# Patient Record
Sex: FEMALE | Race: WHITE | NOT HISPANIC OR LATINO | Employment: FULL TIME | URBAN - METROPOLITAN AREA
[De-identification: names, ages, dates, MRNs, and addresses within clinical notes are randomized per-mention and may not be internally consistent; named-entity substitution may affect disease eponyms.]

---

## 2017-03-29 ENCOUNTER — OFFICE VISIT (OUTPATIENT)
Dept: OBSTETRICS AND GYNECOLOGY | Facility: CLINIC | Age: 37
End: 2017-03-29
Payer: COMMERCIAL

## 2017-03-29 VITALS
WEIGHT: 205.69 LBS | HEIGHT: 65 IN | DIASTOLIC BLOOD PRESSURE: 60 MMHG | SYSTOLIC BLOOD PRESSURE: 110 MMHG | BODY MASS INDEX: 34.27 KG/M2

## 2017-03-29 DIAGNOSIS — Z12.4 PAP SMEAR FOR CERVICAL CANCER SCREENING: Primary | ICD-10-CM

## 2017-03-29 DIAGNOSIS — N92.6 IRREGULAR MENSES: ICD-10-CM

## 2017-03-29 DIAGNOSIS — R35.0 FREQUENCY OF URINATION: ICD-10-CM

## 2017-03-29 PROCEDURE — 88175 CYTOPATH C/V AUTO FLUID REDO: CPT

## 2017-03-29 PROCEDURE — 99999 PR PBB SHADOW E&M-EST. PATIENT-LVL III: CPT | Mod: PBBFAC,,, | Performed by: OBSTETRICS & GYNECOLOGY

## 2017-03-29 PROCEDURE — 87086 URINE CULTURE/COLONY COUNT: CPT

## 2017-03-29 PROCEDURE — 99395 PREV VISIT EST AGE 18-39: CPT | Mod: S$GLB,,, | Performed by: OBSTETRICS & GYNECOLOGY

## 2017-03-29 RX ORDER — NORETHINDRONE ACETATE AND ETHINYL ESTRADIOL 1MG-20(21)
1 KIT ORAL DAILY
Qty: 28 TABLET | Refills: 1 | Status: SHIPPED | OUTPATIENT
Start: 2017-03-29 | End: 2017-04-27

## 2017-03-29 NOTE — PROGRESS NOTES
"Chief Complaint   Patient presents with    Well Woman    new patient    irrg periods       History and Physical:  Patient's last menstrual period was 2017.       Jamee Armando is a 36 y.o.  female who presents today for her routine annual GYN exam. The patient has no Gynecology complaints today. No bowel or bladder complaints. Reports intramenstrual spotting over last 2 cycles, BTL 3 years ago. Irregular abnormal cycles in younger years, prior to pregnancy, normalized after birth of 4yo. Counseled.       Allergies: Review of patient's allergies indicates:  No Known Allergies    Past Medical History:   Diagnosis Date    Thyroid disease     Pt states that "doctor informed her that her levels were elevated, but not high enough to require medications."       Past Surgical History:   Procedure Laterality Date    breast reduction       SECTION      TONSILLECTOMY, ADENOIDECTOMY      Pt states that her adenoids were not removed.       MEDS:   Current Outpatient Prescriptions on File Prior to Visit   Medication Sig Dispense Refill    [DISCONTINUED] naproxen (NAPROSYN) 500 MG tablet Take 1 tablet (500 mg total) by mouth 2 (two) times daily. 20 tablet 0     Current Facility-Administered Medications on File Prior to Visit   Medication Dose Route Frequency Provider Last Rate Last Dose    DTAP-hepatitis B recombinant-IPV injection 0.5 mL  0.5 mL Intramuscular Once Yuliya Anton MD           OB History      Para Term  AB TAB SAB Ectopic Multiple Living    2 2 2       2          Social History     Social History    Marital status:      Spouse name: N/A    Number of children: N/A    Years of education: N/A     Occupational History    Not on file.     Social History Main Topics    Smoking status: Never Smoker    Smokeless tobacco: Never Used    Alcohol use No    Drug use: No    Sexual activity: Yes     Partners: Male     Birth control/ protection: Surgical " "     Comment: tubal ligation     Other Topics Concern    Not on file     Social History Narrative       Family History   Problem Relation Age of Onset    Cancer Paternal Grandfather      pancreatic    Alzheimer's disease Paternal Grandmother     Stroke Maternal Grandmother     Cancer Maternal Grandfather     Heart attack Father     Ovarian cancer Neg Hx     Breast cancer Neg Hx          Past medical and surgical history reviewed.   I have reviewed the patient's medical history in detail and updated the computerized patient record.        Review of System:   General: no chills, fever, night sweats, weight gain or weight loss  Psychological: no depression or suicidal ideation  Breasts: no new or changing breast lumps, nipple discharge or masses.  Respiratory: no cough, shortness of breath, or wheezing  Cardiovascular: no chest pain or dyspnea on exertion  Gastrointestinal: no abdominal pain, change in bowel habits, or black or bloody stools  Genito-Urinary: no incontinence, urinary frequency/urgency or vulvar/vaginal symptoms, pelvic pain.   Musculoskeletal: no gait disturbance or muscular weakness      Physical Exam:   /60  Ht 5' 4.5" (1.638 m)  Wt 93.3 kg (205 lb 11 oz)  LMP 03/16/2017  BMI 34.76 kg/m2  Constitutional: She is oriented to person, place, and time. She appears well-developed and well-nourished. No distress.   HENT:   Head: Normocephalic and atraumatic.   Eyes: Conjunctivae and EOM are normal. No scleral icterus.   Neck: Normal range of motion. Neck supple. No tracheal deviation present.   Cardiovascular: Normal rate.    Pulmonary/Chest: Effort normal. No respiratory distress. She exhibits no tenderness.  Breasts: are symmetrical. Well healed reduction scars.    Right breast exhibits no inverted nipple, no mass, no nipple discharge, no skin change and no tenderness.   Left breast exhibits no inverted nipple, no mass, no nipple discharge, no skin change and no tenderness.  Abdominal: " Soft. She exhibits no distension and no mass. There is no tenderness. There is no rebound and no guarding.   Genitourinary:    External rectal exam shows no thrombosed external hemorrhoids.    Pelvic exam was performed with patient supine.   No labial fusion.   There is no rash, lesion or injury on the right labia.   There is no rash, lesion or injury on the left labia.   No bleeding and no signs of injury around the vaginal introitus, urethra is without lesions and well supported. The cervix is visualized with no discharge, lesions or friability.   No vaginal discharge found.    No significant Cystocele, Enterocele or rectocele, and uterus well supported.   Bimanual exam:   The urethra is normal to palpation and there are no palpable vaginal wall masses.   Uterus is not deviated, not enlarged, not fixed, normal shape and not tender.   Cervix exhibits no motion tenderness.    Right adnexum displays no mass and no tenderness.   Left adnexum displays no mass and no tenderness.  Musculoskeletal: Normal range of motion.   Lymphadenopathy: No inguinal adenopathy present.   Neurological: She is alert and oriented to person, place, and time. Coordination normal.   Skin: Skin is warm and dry. She is not diaphoretic.   Psychiatric: She has a normal mood and affect.      Assessment:   Normal annual GYN exam  1. Pap smear for cervical cancer screening  Liquid-based pap smear, screening   2. Frequency of urination  Urine culture       Plan:   PAP  Mammogram at 41  oral contraceptive pills x 2 cycles, counseled on endometrial ablation if not improved. Will call with results.   Follow up in 1 year.

## 2017-03-29 NOTE — MR AVS SNAPSHOT
"    [facility]  [address] [place] [id number]  Phone: [phone]                  Jamee Armando   3/29/2017 10:40 AM   Office Visit    Description:  Female : 1980   Provider:  Tommie Elena MD   Department:  [facility]           Reason for Visit     Well Woman     new patient     irrg periods           Diagnoses this Visit        Comments    Pap smear for cervical cancer screening    -  Primary            To Do List           Goals (5 Years of Data)     None      [facility]     [facility] Nurse Care Line -  Assistance  Registered nurses in the [facility] provide clinical advisement, health education, appointment booking, and other advisory services.  Call for this free service at [phone].             Medications           Message regarding Medications     Verify the changes and/or additions to your medication regime listed below are the same as discussed with your clinician today.  If any of these changes or additions are incorrect, please notify your healthcare provider.        STOP taking these medications     naproxen (NAPROSYN) 500 MG tablet Take 1 tablet (500 mg total) by mouth 2 (two) times daily.           Verify that the below list of medications is an accurate representation of the medications you are currently taking.  If none reported, the list may be blank. If incorrect, please contact your healthcare provider. Carry this list with you in case of emergency.           Current Medications            Clinical Reference Information           Your Vitals Were     BP Height Weight Last Period BMI    110/60 5' 4.5" (1.638 m) 93.3 kg (205 lb 11 oz) 2017 34.76 kg/m2      Blood Pressure          Most Recent Value    BP  110/60      Allergies as of 3/29/2017     No Known Allergies      Immunizations Administered on Date of Encounter - 3/29/2017     None      Orders Placed During Today's Visit      Normal Orders This Visit    " Liquid-based pap smear, screening       Language Assistance Services     ATTENTION: Language assistance services are available, free of charge. Please call [phone].      ATENCIÓN: Si habla lawrenceañol, tiene a marcos disposición servicios gratuitos de asistencia lingüística. Llame al [phone].     CHÚ Ý: N?u b?n nói Ti?ng Vi?t, có các d?ch v? h? tr? ngôn ng? mi?n phí dành cho b?n. G?i s? [phone].         [facility] complies with applicable Federal civil rights laws and does not discriminate on the basis of race, color, national origin, age, disability, or sex.

## 2017-03-31 LAB
BACTERIA UR CULT: NORMAL
BACTERIA UR CULT: NORMAL

## 2017-04-23 ENCOUNTER — NURSE TRIAGE (OUTPATIENT)
Dept: ADMINISTRATIVE | Facility: CLINIC | Age: 37
End: 2017-04-23

## 2017-04-23 NOTE — TELEPHONE ENCOUNTER
Reason for Disposition   [1] Continuous (nonstop) coughing interferes with work or school AND [2] no improvement using cough treatment per Care Advice    Protocols used: ST COUGH - ACUTE [id number]    Advised M [facility]

## 2017-04-25 ENCOUNTER — PATIENT MESSAGE (OUTPATIENT)
Dept: OBSTETRICS AND GYNECOLOGY | Facility: CLINIC | Age: 37
End: 2017-04-25

## 2017-04-28 ENCOUNTER — TELEPHONE (OUTPATIENT)
Dept: OBSTETRICS AND GYNECOLOGY | Facility: CLINIC | Age: 37
End: 2017-04-28

## 2017-04-28 NOTE — TELEPHONE ENCOUNTER
----- Message from Renuka Silva sent at 4/28/2017  9:47 AM CDT -----  Contact: self  Patient called stating that the birth control that was send to [facility] they do not have it in stock   She would like to have it called to [facility] on [address]    Please call     Thanks

## 2018-08-07 ENCOUNTER — OFFICE VISIT (OUTPATIENT)
Dept: OBSTETRICS AND GYNECOLOGY | Facility: CLINIC | Age: 38
End: 2018-08-07
Payer: COMMERCIAL

## 2018-08-07 VITALS
BODY MASS INDEX: 34.31 KG/M2 | DIASTOLIC BLOOD PRESSURE: 70 MMHG | SYSTOLIC BLOOD PRESSURE: 112 MMHG | WEIGHT: 203.06 LBS

## 2018-08-07 DIAGNOSIS — N92.1 MENOMETRORRHAGIA: Primary | ICD-10-CM

## 2018-08-07 PROCEDURE — 88305 TISSUE EXAM BY PATHOLOGIST: CPT | Mod: 26,,, | Performed by: PATHOLOGY

## 2018-08-07 PROCEDURE — 3008F BODY MASS INDEX DOCD: CPT | Mod: CPTII,S$GLB,, | Performed by: OBSTETRICS & GYNECOLOGY

## 2018-08-07 PROCEDURE — 99999 PR PBB SHADOW E&M-EST. PATIENT-LVL III: CPT | Mod: PBBFAC,,, | Performed by: OBSTETRICS & GYNECOLOGY

## 2018-08-07 PROCEDURE — 88305 TISSUE EXAM BY PATHOLOGIST: CPT | Performed by: PATHOLOGY

## 2018-08-07 PROCEDURE — 99214 OFFICE O/P EST MOD 30 MIN: CPT | Mod: 25,S$GLB,, | Performed by: OBSTETRICS & GYNECOLOGY

## 2018-08-07 PROCEDURE — 58100 BIOPSY OF UTERUS LINING: CPT | Mod: S$GLB,,, | Performed by: OBSTETRICS & GYNECOLOGY

## 2018-08-07 NOTE — PROGRESS NOTES
Chief Complaint   Patient presents with    Menstrual Problem     patient had tubes tied about 5 years ago and has been having periods on and off throughout the months; heavier periods       History of Present Illness   38 y.o.  female patient presents today for continued menometrorrhagia - no better last year on oral contraceptive pills , counseled.     Past medical and surgical history reviewed.   I have reviewed the patient's medical history in detail and updated the computerized patient record.    Review of patient's allergies indicates:  No Known Allergies      Review of Systems - Negative except HPI  GEN ROS: negative for - chills or fever  Breast ROS: negative for breast lumps  Genito-Urinary ROS: no dysuria, trouble voiding, or hematuria      Physical Examination:  /70   Wt 92.1 kg (203 lb 0.7 oz)   LMP 07/21/2018   BMI 34.31 kg/m²    Constitutional: She is oriented to person, place, and time. She appears well-developed and well-nourished. No distress.   HENT:   Head: Normocephalic and atraumatic.   Eyes: Conjunctivae and EOM are normal. No scleral icterus.   Neck: Normal range of motion. Neck supple. No tracheal deviation present.   Cardiovascular: Normal rate.    Pulmonary/Chest: Effort normal. No respiratory distress. She exhibits no tenderness.  Abdominal: Soft. She exhibits no distension and no mass. There is no tenderness. There is no rebound and no guarding.   Genitourinary:    External rectal exam shows no thrombosed external hemorrhoids.    Pelvic exam was performed with patient supine.   No labial fusion.   There is no rash, lesion or injury on the right labia.   There is no rash, lesion or injury on the left labia.   No bleeding and no signs of injury around the vaginal introitus, urethra is without lesions and well supported. The cervix is visualized with no discharge, lesions or friability.   No vaginal discharge found. Minimal blood in the vault   No significant Cystocele,  Enterocele or rectocele, and uterus well supported.   Bimanual exam:   The urethra is normal to palpation and there are no palpable vaginal wall masses.   Uterus is not deviated, not enlarged, not fixed, normal shape and not tender.   Cervix exhibits no motion tenderness.    Right adnexum displays no mass and no tenderness.   Left adnexum displays no mass and no tenderness.  Musculoskeletal: Normal range of motion.   Lymphadenopathy: No inguinal adenopathy present.   Neurological: She is alert and oriented to person, place, and time. Coordination normal.   Skin: Skin is warm and dry. She is not diaphoretic.   Psychiatric: She has a normal mood and affect.      Endometrial biopsy:  8/7/2018   Patient was prepped and draped in the usual fashion after verbal consent was obtained. Cervix was cleaned with betadine and endometrial pipelle was passed to a depth of 9cm without difficulty with moderate return of tissue.  Additional instrumentation needed: none   Tolerated well, specimen sent to pathology.    Patient informed will be contacted with results within 2 weeks. Encouraged to please call back or email if she has not heard from us by then.    Assessment:  1. Menometrorrhagia  US Pelvis Complete Non OB    CBC Without Differential    hCG, quantitative    TSH   h/o BTL for Birth control     Plan:  Endometrial Biopsy today  Pelvic u/s  If above normal, offered progesterone IUD or endometrial ablation - Novasure.   Patient informed will be contacted with results within 2 weeks. Encouraged to please call back or email if she has not heard from us by then.

## 2018-08-10 ENCOUNTER — HOSPITAL ENCOUNTER (OUTPATIENT)
Dept: RADIOLOGY | Facility: HOSPITAL | Age: 38
Discharge: HOME | End: 2018-08-10
Attending: OBSTETRICS & GYNECOLOGY
Payer: COMMERCIAL

## 2018-08-10 DIAGNOSIS — N92.1 MENOMETRORRHAGIA: ICD-10-CM

## 2018-08-10 PROCEDURE — 76856 US EXAM PELVIC COMPLETE: CPT | Mod: 26,,, | Performed by: RADIOLOGY

## 2018-08-10 PROCEDURE — 76830 TRANSVAGINAL US NON-OB: CPT | Mod: TC,PO

## 2018-08-10 PROCEDURE — 76830 TRANSVAGINAL US NON-OB: CPT | Mod: 26,,, | Performed by: RADIOLOGY

## 2018-08-22 ENCOUNTER — OFFICE VISIT (OUTPATIENT)
Dept: OBSTETRICS AND GYNECOLOGY | Facility: CLINIC | Age: 38
End: 2018-08-22
Payer: COMMERCIAL

## 2018-08-22 VITALS
SYSTOLIC BLOOD PRESSURE: 120 MMHG | WEIGHT: 205.25 LBS | BODY MASS INDEX: 34.69 KG/M2 | DIASTOLIC BLOOD PRESSURE: 74 MMHG

## 2018-08-22 DIAGNOSIS — N84.0 ENDOMETRIAL POLYP: Primary | ICD-10-CM

## 2018-08-22 DIAGNOSIS — N92.1 MENOMETRORRHAGIA: ICD-10-CM

## 2018-08-22 PROCEDURE — 99213 OFFICE O/P EST LOW 20 MIN: CPT | Mod: 57,S$GLB,, | Performed by: OBSTETRICS & GYNECOLOGY

## 2018-08-22 PROCEDURE — 99999 PR PBB SHADOW E&M-EST. PATIENT-LVL III: CPT | Mod: PBBFAC,,, | Performed by: OBSTETRICS & GYNECOLOGY

## 2018-08-22 PROCEDURE — 3008F BODY MASS INDEX DOCD: CPT | Mod: CPTII,S$GLB,, | Performed by: OBSTETRICS & GYNECOLOGY

## 2018-08-22 NOTE — PROGRESS NOTES
"      ADMISSION H&P:  2018      Chief complaint: heavy prolonged menses      Indications for Surgery: 3cm endometrial polyp, menorrhagia      History of present illness:  Jamee Armando 38 y.o.  female , c/section x 2 with benign endometrial biopsy, heavy menses 5 days each month followed by 7 days of spotting every month. U/s showed 2cm - 3cm fundal endometrial polyp. Counseled on options, all medical and surgical options, agreed to hysteroscopy / myosure polyp resection and endometrial ablation.     Allergies: Review of patient's allergies indicates:  No Known Allergies    Past Medical History:   Diagnosis Date    Thyroid disease     Pt states that "doctor informed her that her levels were elevated, but not high enough to require medications."       Past Surgical History:   Procedure Laterality Date    breast reduction       SECTION      TONSILLECTOMY, ADENOIDECTOMY      Pt states that her adenoids were not removed.       MEDS:   No current outpatient medications on file prior to visit.     Current Facility-Administered Medications on File Prior to Visit   Medication Dose Route Frequency Provider Last Rate Last Dose    DTAP-hepatitis B recombinant-IPV injection 0.5 mL  0.5 mL Intramuscular Once Yuliya Anton MD           OB History      Para Term  AB Living    2 2 2     2    SAB TAB Ectopic Multiple Live Births            2          Social History     Socioeconomic History    Marital status:      Spouse name: Not on file    Number of children: Not on file    Years of education: Not on file    Highest education level: Not on file   Social Needs    Financial resource strain: Not on file    Food insecurity - worry: Not on file    Food insecurity - inability: Not on file    Transportation needs - medical: Not on file    Transportation needs - non-medical: Not on file   Occupational History    Not on file   Tobacco Use    Smoking status: " Never Smoker    Smokeless tobacco: Never Used   Substance and Sexual Activity    Alcohol use: No    Drug use: No    Sexual activity: Yes     Partners: Male     Birth control/protection: Surgical     Comment: tubal ligation   Other Topics Concern    Not on file   Social History Narrative    Not on file       Family History   Problem Relation Age of Onset    Cancer Paternal Grandfather         pancreatic    Alzheimer's disease Paternal Grandmother     Stroke Maternal Grandmother     Cancer Maternal Grandfather     Heart attack Father     Ovarian cancer Neg Hx     Breast cancer Neg Hx        Review of Systems - Negative except HPI  GEN ROS: negative for - chills or fever  Breast ROS: negative for breast lumps  Genito-Urinary ROS: no dysuria, trouble voiding, or hematuria        Physical Examination:  /70   Wt 92.1 kg (203 lb 0.7 oz)   LMP 07/21/2018   BMI 34.31 kg/m²    Constitutional: She is oriented to person, place, and time. She appears well-developed and well-nourished. No distress.   HENT:   Head: Normocephalic and atraumatic.   Eyes: Conjunctivae and EOM are normal. No scleral icterus.   Neck: Normal range of motion. Neck supple. No tracheal deviation present.   Cardiovascular: Normal rate.    Pulmonary/Chest: Effort normal. No respiratory distress. She exhibits no tenderness.  Abdominal: Soft. She exhibits no distension and no mass. There is no tenderness. There is no rebound and no guarding.   Genitourinary:    External rectal exam shows no thrombosed external hemorrhoids.    Pelvic exam was performed with patient supine.   No labial fusion.   There is no rash, lesion or injury on the right labia.   There is no rash, lesion or injury on the left labia.   No bleeding and no signs of injury around the vaginal introitus, urethra is without lesions and well supported. The cervix is visualized with no discharge, lesions or friability.   No vaginal discharge found. Minimal blood in the vault    No significant Cystocele, Enterocele or rectocele, and uterus well supported.   Bimanual exam:   The urethra is normal to palpation and there are no palpable vaginal wall masses.   Uterus is not deviated, not enlarged, not fixed, normal shape and not tender.   Cervix exhibits no motion tenderness.    Right adnexum displays no mass and no tenderness.   Left adnexum displays no mass and no tenderness.  Musculoskeletal: Normal range of motion.   Lymphadenopathy: No inguinal adenopathy present.   Neurological: She is alert and oriented to person, place, and time. Coordination normal.   Skin: Skin is warm and dry. She is not diaphoretic.   Psychiatric: She has a normal mood and affect.       Ultrasound 8/10/2018:    FINDINGS:  The uterus measures 10.3 x 6.9 x 4.5 cm and the endometrium demonstrates focal thickening in the fundus to 18 mm worrisome for an endometrial polyp, endometrial hyperplasia, or endometrial neoplasm.  Further evaluation is necessary.  This rounded focus measures 2.8 x 1.3 x 2.2 cm.    The right ovary is thought to be visualized at 3.7 x 2.4 x 2.1 cm and the left ovary is thought to be visualized at 4.0 x 2.6 x 2.1 cm    This report was flagged in Epic as abnormal.      Impression       1. Mass at the fundus of the uterus suggestive a endometrial polyp, endometrial hyperplasia, or endometrial neoplasm.  Further evaluation is necessary.           Assessment:  menometrorrhagia , endometrial polyp  S/p BTL distant past    Plan:  Hysteroscopy, polypectomy, endometrial ablation - [facility] at her convenience.           Tommie Elena M.D., FACOG

## 2018-09-18 RX ORDER — DIPHENHYDRAMINE HCL 25 MG
25 CAPSULE ORAL EVERY 6 HOURS PRN
COMMUNITY
End: 2020-12-03

## 2018-09-19 ENCOUNTER — ANESTHESIA EVENT (OUTPATIENT)
Dept: SURGERY | Facility: HOSPITAL | Age: 38
End: 2018-09-19
Payer: COMMERCIAL

## 2018-09-20 ENCOUNTER — HOSPITAL ENCOUNTER (OUTPATIENT)
Facility: HOSPITAL | Age: 38
Discharge: HOME | End: 2018-09-20
Attending: OBSTETRICS & GYNECOLOGY | Admitting: OBSTETRICS & GYNECOLOGY
Payer: COMMERCIAL

## 2018-09-20 ENCOUNTER — ANESTHESIA (OUTPATIENT)
Dept: SURGERY | Facility: HOSPITAL | Age: 38
End: 2018-09-20
Payer: COMMERCIAL

## 2018-09-20 DIAGNOSIS — Z98.891 HISTORY OF C-SECTION: ICD-10-CM

## 2018-09-20 DIAGNOSIS — N92.1 MENOMETRORRHAGIA: ICD-10-CM

## 2018-09-20 DIAGNOSIS — N84.0 ENDOMETRIAL POLYP: Primary | ICD-10-CM

## 2018-09-20 LAB
B-HCG UR QL: NEGATIVE
CTP QC/QA: YES

## 2018-09-20 PROCEDURE — 63600175 PHARM REV CODE 636 W HCPCS: Mod: PO | Performed by: NURSE ANESTHETIST, CERTIFIED REGISTERED

## 2018-09-20 PROCEDURE — 37000009 HC ANESTHESIA EA ADD 15 MINS: Mod: PO | Performed by: OBSTETRICS & GYNECOLOGY

## 2018-09-20 PROCEDURE — 36000707: Mod: PO | Performed by: OBSTETRICS & GYNECOLOGY

## 2018-09-20 PROCEDURE — 27200651 HC AIRWAY, LMA: Mod: PO | Performed by: NURSE ANESTHETIST, CERTIFIED REGISTERED

## 2018-09-20 PROCEDURE — 25000003 PHARM REV CODE 250: Mod: PO | Performed by: ANESTHESIOLOGY

## 2018-09-20 PROCEDURE — 25000003 PHARM REV CODE 250: Mod: PO | Performed by: NURSE ANESTHETIST, CERTIFIED REGISTERED

## 2018-09-20 PROCEDURE — 88305 TISSUE EXAM BY PATHOLOGIST: CPT | Performed by: PATHOLOGY

## 2018-09-20 PROCEDURE — D9220A PRA ANESTHESIA: Mod: ANES,,, | Performed by: ANESTHESIOLOGY

## 2018-09-20 PROCEDURE — 36000706: Mod: PO | Performed by: OBSTETRICS & GYNECOLOGY

## 2018-09-20 PROCEDURE — 63600175 PHARM REV CODE 636 W HCPCS: Mod: PO | Performed by: ANESTHESIOLOGY

## 2018-09-20 PROCEDURE — 71000033 HC RECOVERY, INTIAL HOUR: Mod: PO | Performed by: OBSTETRICS & GYNECOLOGY

## 2018-09-20 PROCEDURE — 81025 URINE PREGNANCY TEST: CPT | Mod: PO | Performed by: OBSTETRICS & GYNECOLOGY

## 2018-09-20 PROCEDURE — 27201423 OPTIME MED/SURG SUP & DEVICES STERILE SUPPLY: Mod: PO | Performed by: OBSTETRICS & GYNECOLOGY

## 2018-09-20 PROCEDURE — 58558 HYSTEROSCOPY BIOPSY: CPT | Mod: ,,, | Performed by: OBSTETRICS & GYNECOLOGY

## 2018-09-20 PROCEDURE — 37000008 HC ANESTHESIA 1ST 15 MINUTES: Mod: PO | Performed by: OBSTETRICS & GYNECOLOGY

## 2018-09-20 PROCEDURE — 71000039 HC RECOVERY, EACH ADD'L HOUR: Mod: PO | Performed by: OBSTETRICS & GYNECOLOGY

## 2018-09-20 PROCEDURE — D9220A PRA ANESTHESIA: Mod: CRNA,,, | Performed by: NURSE ANESTHETIST, CERTIFIED REGISTERED

## 2018-09-20 RX ORDER — KETAMINE HYDROCHLORIDE 100 MG/ML
INJECTION, SOLUTION INTRAMUSCULAR; INTRAVENOUS
Status: DISCONTINUED | OUTPATIENT
Start: 2018-09-20 | End: 2018-09-20

## 2018-09-20 RX ORDER — ONDANSETRON 2 MG/ML
INJECTION INTRAMUSCULAR; INTRAVENOUS
Status: DISCONTINUED | OUTPATIENT
Start: 2018-09-20 | End: 2018-09-20

## 2018-09-20 RX ORDER — NAPROXEN SODIUM 220 MG
440 TABLET ORAL
COMMUNITY
End: 2020-12-03

## 2018-09-20 RX ORDER — ONDANSETRON 8 MG/1
8 TABLET, ORALLY DISINTEGRATING ORAL EVERY 8 HOURS PRN
Status: DISCONTINUED | OUTPATIENT
Start: 2018-09-20 | End: 2018-09-20 | Stop reason: HOSPADM

## 2018-09-20 RX ORDER — FENTANYL CITRATE 50 UG/ML
25 INJECTION, SOLUTION INTRAMUSCULAR; INTRAVENOUS EVERY 5 MIN PRN
Status: DISCONTINUED | OUTPATIENT
Start: 2018-09-20 | End: 2018-09-20 | Stop reason: HOSPADM

## 2018-09-20 RX ORDER — ONDANSETRON 4 MG/1
8 TABLET, ORALLY DISINTEGRATING ORAL EVERY 8 HOURS PRN
Qty: 12 TABLET | Refills: 0 | Status: SHIPPED | OUTPATIENT
Start: 2018-09-20 | End: 2020-12-03

## 2018-09-20 RX ORDER — AMOXICILLIN 250 MG
1 CAPSULE ORAL 2 TIMES DAILY
Status: DISCONTINUED | OUTPATIENT
Start: 2018-09-20 | End: 2018-09-20 | Stop reason: HOSPADM

## 2018-09-20 RX ORDER — OXYCODONE HYDROCHLORIDE 5 MG/1
5 TABLET ORAL ONCE AS NEEDED
Status: COMPLETED | OUTPATIENT
Start: 2018-09-20 | End: 2018-09-20

## 2018-09-20 RX ORDER — SODIUM CHLORIDE, SODIUM LACTATE, POTASSIUM CHLORIDE, CALCIUM CHLORIDE 600; 310; 30; 20 MG/100ML; MG/100ML; MG/100ML; MG/100ML
INJECTION, SOLUTION INTRAVENOUS CONTINUOUS
Status: DISCONTINUED | OUTPATIENT
Start: 2018-09-20 | End: 2023-06-09

## 2018-09-20 RX ORDER — POLYETHYLENE GLYCOL 3350 17 G/17G
17 POWDER, FOR SOLUTION ORAL DAILY
Qty: 255 G | Refills: 1 | Status: SHIPPED | OUTPATIENT
Start: 2018-09-20 | End: 2020-12-03

## 2018-09-20 RX ORDER — ACETAMINOPHEN 10 MG/ML
INJECTION, SOLUTION INTRAVENOUS
Status: DISCONTINUED | OUTPATIENT
Start: 2018-09-20 | End: 2018-09-20

## 2018-09-20 RX ORDER — DEXTROSE, SODIUM CHLORIDE, SODIUM LACTATE, POTASSIUM CHLORIDE, AND CALCIUM CHLORIDE 5; .6; .31; .03; .02 G/100ML; G/100ML; G/100ML; G/100ML; G/100ML
INJECTION, SOLUTION INTRAVENOUS CONTINUOUS
Status: DISCONTINUED | OUTPATIENT
Start: 2018-09-20 | End: 2018-09-20 | Stop reason: HOSPADM

## 2018-09-20 RX ORDER — FENTANYL CITRATE 50 UG/ML
INJECTION, SOLUTION INTRAMUSCULAR; INTRAVENOUS
Status: DISCONTINUED | OUTPATIENT
Start: 2018-09-20 | End: 2018-09-20

## 2018-09-20 RX ORDER — DIPHENHYDRAMINE HCL 50 MG
50 CAPSULE ORAL EVERY 4 HOURS PRN
Status: DISCONTINUED | OUTPATIENT
Start: 2018-09-20 | End: 2018-09-20 | Stop reason: HOSPADM

## 2018-09-20 RX ORDER — IBUPROFEN 800 MG/1
800 TABLET ORAL EVERY 8 HOURS PRN
Qty: 30 TABLET | Refills: 0 | Status: SHIPPED | OUTPATIENT
Start: 2018-09-20 | End: 2020-12-03

## 2018-09-20 RX ORDER — SODIUM CHLORIDE 0.9 % (FLUSH) 0.9 %
3 SYRINGE (ML) INJECTION
Status: DISCONTINUED | OUTPATIENT
Start: 2018-09-20 | End: 2018-09-20 | Stop reason: HOSPADM

## 2018-09-20 RX ORDER — LIDOCAINE HYDROCHLORIDE 10 MG/ML
0.5 INJECTION, SOLUTION EPIDURAL; INFILTRATION; INTRACAUDAL; PERINEURAL ONCE AS NEEDED
Status: ACTIVE | OUTPATIENT
Start: 2018-09-20 | End: 2018-09-20

## 2018-09-20 RX ORDER — LIDOCAINE HCL/PF 100 MG/5ML
SYRINGE (ML) INTRAVENOUS
Status: DISCONTINUED | OUTPATIENT
Start: 2018-09-20 | End: 2018-09-20

## 2018-09-20 RX ORDER — DEXAMETHASONE SODIUM PHOSPHATE 4 MG/ML
8 INJECTION, SOLUTION INTRA-ARTICULAR; INTRALESIONAL; INTRAMUSCULAR; INTRAVENOUS; SOFT TISSUE
Status: COMPLETED | OUTPATIENT
Start: 2018-09-20 | End: 2018-09-20

## 2018-09-20 RX ORDER — OXYCODONE AND ACETAMINOPHEN 5; 325 MG/1; MG/1
1 TABLET ORAL EVERY 4 HOURS PRN
Qty: 20 TABLET | Refills: 0 | Status: SHIPPED | OUTPATIENT
Start: 2018-09-20 | End: 2020-12-03

## 2018-09-20 RX ORDER — IBUPROFEN 400 MG/1
800 TABLET ORAL EVERY 8 HOURS PRN
Status: DISCONTINUED | OUTPATIENT
Start: 2018-09-20 | End: 2018-09-20 | Stop reason: HOSPADM

## 2018-09-20 RX ORDER — MIDAZOLAM HYDROCHLORIDE 1 MG/ML
INJECTION, SOLUTION INTRAMUSCULAR; INTRAVENOUS
Status: DISCONTINUED | OUTPATIENT
Start: 2018-09-20 | End: 2018-09-20

## 2018-09-20 RX ORDER — HYDROCODONE BITARTRATE AND ACETAMINOPHEN 5; 325 MG/1; MG/1
1 TABLET ORAL EVERY 4 HOURS PRN
Status: DISCONTINUED | OUTPATIENT
Start: 2018-09-20 | End: 2018-09-20 | Stop reason: HOSPADM

## 2018-09-20 RX ORDER — PROPOFOL 10 MG/ML
VIAL (ML) INTRAVENOUS
Status: DISCONTINUED | OUTPATIENT
Start: 2018-09-20 | End: 2018-09-20

## 2018-09-20 RX ORDER — CEFAZOLIN SODIUM 1 G/3ML
INJECTION, POWDER, FOR SOLUTION INTRAMUSCULAR; INTRAVENOUS
Status: DISCONTINUED | OUTPATIENT
Start: 2018-09-20 | End: 2018-09-20

## 2018-09-20 RX ORDER — KETOROLAC TROMETHAMINE 30 MG/ML
INJECTION, SOLUTION INTRAMUSCULAR; INTRAVENOUS
Status: DISCONTINUED | OUTPATIENT
Start: 2018-09-20 | End: 2018-09-20

## 2018-09-20 RX ORDER — CIPROFLOXACIN 500 MG/1
500 TABLET ORAL
Status: DISCONTINUED | OUTPATIENT
Start: 2018-09-20 | End: 2018-09-20 | Stop reason: HOSPADM

## 2018-09-20 RX ADMIN — ACETAMINOPHEN 1000 MG: 10 INJECTION, SOLUTION INTRAVENOUS at 12:09

## 2018-09-20 RX ADMIN — FENTANYL CITRATE 50 MCG: 50 INJECTION, SOLUTION INTRAMUSCULAR; INTRAVENOUS at 12:09

## 2018-09-20 RX ADMIN — MIDAZOLAM HYDROCHLORIDE 2 MG: 1 INJECTION, SOLUTION INTRAMUSCULAR; INTRAVENOUS at 12:09

## 2018-09-20 RX ADMIN — FENTANYL CITRATE 25 MCG: 50 INJECTION INTRAMUSCULAR; INTRAVENOUS at 01:09

## 2018-09-20 RX ADMIN — CEFAZOLIN 2 G: 1 INJECTION, POWDER, FOR SOLUTION INTRAVENOUS at 12:09

## 2018-09-20 RX ADMIN — DEXAMETHASONE SODIUM PHOSPHATE 8 MG: 4 INJECTION, SOLUTION INTRAMUSCULAR; INTRAVENOUS at 11:09

## 2018-09-20 RX ADMIN — KETAMINE HYDROCHLORIDE 25 MG: 100 INJECTION, SOLUTION, CONCENTRATE INTRAMUSCULAR; INTRAVENOUS at 12:09

## 2018-09-20 RX ADMIN — SODIUM CHLORIDE, SODIUM LACTATE, POTASSIUM CHLORIDE, AND CALCIUM CHLORIDE: .6; .31; .03; .02 INJECTION, SOLUTION INTRAVENOUS at 11:09

## 2018-09-20 RX ADMIN — ONDANSETRON 4 MG: 2 INJECTION, SOLUTION INTRAMUSCULAR; INTRAVENOUS at 12:09

## 2018-09-20 RX ADMIN — KETOROLAC TROMETHAMINE 30 MG: 30 INJECTION, SOLUTION INTRAMUSCULAR; INTRAVENOUS at 12:09

## 2018-09-20 RX ADMIN — OXYCODONE HYDROCHLORIDE 5 MG: 5 TABLET ORAL at 01:09

## 2018-09-20 RX ADMIN — LIDOCAINE HYDROCHLORIDE 100 MG: 20 INJECTION PARENTERAL at 12:09

## 2018-09-20 RX ADMIN — PROPOFOL 200 MG: 10 INJECTION, EMULSION INTRAVENOUS at 12:09

## 2018-09-20 NOTE — DISCHARGE INSTRUCTIONS
HYSTEROSCOPY/MYOMECTOMY//ENDOMETRIAL ABLATION/D&C  After surgery:  DOS:   Minimal activity for 24 hours.    Advance diet as tolerated   Expect some irregular menstrual bleeding.   Showers only for __ weeks    Resume home medications as prescribed    DONT:   No lifting more than 30 pounds for __ weeks.    Nothing in the vagina for __ weeks. No tampons, douching or intercourse.   No driving for 24 hours or while taking narcotic pain medication   Before driving, be sure you can press the brakes all the way to the floor without difficulty or pain.   DO NOT TAKE ADDITIONAL TYLENOL/ACETAMINOPHEN WHILE TAKING NARCOTIC PAIN MEDICATION THAT CONTAINS TYLENOL/ACETAMINOPHEN.    CALL PHYSICIAN FOR:   Heavy vaginal bleeding.   Fever>100.5   Persistent nausea and vomiting lasting > 12 hours   Redness, swelling, or drainage from incisions   Abdominal pain not improving day to day.          Discharge Instructions: After Your Surgery  Youve just had surgery. During surgery, you were given medicine called anesthesia to keep you relaxed and free of pain. After surgery, you may have some pain or nausea. This is common. Here are some tips for feeling better and getting well after surgery.     Stay on schedule with your medicine.   Going home  Your healthcare provider will show you how to take care of yourself when you go home. He or she will also answer your questions. Have an adult family member or friend drive you home. For the first 24 hours after your surgery:  · Do not drive or use heavy equipment.  · Do not make important decisions or sign legal papers.  · Do not drink alcohol.  · Have someone stay with you, if needed. He or she can watch for problems and help keep you safe.  Be sure to go to all follow-up visits with your healthcare provider. And rest after your surgery for as long as your healthcare provider tells you to.  Coping with pain  If you have pain after surgery, pain medicine will help you feel  better. Take it as told, before pain becomes severe. Also, ask your healthcare provider or pharmacist about other ways to control pain. This might be with heat, ice, or relaxation. And follow any other instructions your surgeon or nurse gives you.  Tips for taking pain medicine  To get the best relief possible, remember these points:  · Pain medicines can upset your stomach. Taking them with a little food may help.  · Most pain relievers taken by mouth need at least 20 to 30 minutes to start to work.  · Taking medicine on a schedule can help you remember to take it. Try to time your medicine so that you can take it before starting an activity. This might be before you get dressed, go for a walk, or sit down for dinner.  · Constipation is a common side effect of pain medicines. Call your healthcare provider before taking any medicines such as laxatives or stool softeners to help ease constipation. Also ask if you should skip any foods. Drinking lots of fluids and eating foods such as fruits and vegetables that are high in fiber can also help. Remember, do not take laxatives unless your surgeon has prescribed them.  · Drinking alcohol and taking pain medicine can cause dizziness and slow your breathing. It can even be deadly. Do not drink alcohol while taking pain medicine.  · Pain medicine can make you react more slowly to things. Do not drive or run machinery while taking pain medicine.  Your healthcare provider may tell you to take acetaminophen to help ease your pain. Ask him or her how much you are supposed to take each day. Acetaminophen or other pain relievers may interact with your prescription medicines or other over-the-counter (OTC) medicines. Some prescription medicines have acetaminophen and other ingredients. Using both prescription and OTC acetaminophen for pain can cause you to overdose. Read the labels on your OTC medicines with care. This will help you to clearly know the list of ingredients, how  much to take, and any warnings. It may also help you not take too much acetaminophen. If you have questions or do not understand the information, ask your pharmacist or healthcare provider to explain it to you before you take the OTC medicine.  Managing nausea  Some people have an upset stomach after surgery. This is often because of anesthesia, pain, or pain medicine, or the stress of surgery. These tips will help you handle nausea and eat healthy foods as you get better. If you were on a special food plan before surgery, ask your healthcare provider if you should follow it while you get better. These tips may help:  · Do not push yourself to eat. Your body will tell you when to eat and how much.  · Start off with clear liquids and soup. They are easier to digest.  · Next try semi-solid foods, such as mashed potatoes, applesauce, and gelatin, as you feel ready.  · Slowly move to solid foods. Dont eat fatty, rich, or spicy foods at first.  · Do not force yourself to have 3 large meals a day. Instead eat smaller amounts more often.  · Take pain medicines with a small amount of solid food, such as crackers or toast, to avoid nausea.     Call your surgeon if  · You still have pain an hour after taking medicine. The medicine may not be strong enough.  · You feel too sleepy, dizzy, or groggy. The medicine may be too strong.  · You have side effects like nausea, vomiting, or skin changes, such as rash, itching, or hives.       If you have obstructive sleep apnea  You were given anesthesia medicine during surgery to keep you comfortable and free of pain. After surgery, you may have more apnea spells because of this medicine and other medicines you were given. The spells may last longer than usual.   At home:  · Keep using the continuous positive airway pressure (CPAP) device when you sleep. Unless your healthcare provider tells you not to, use it when you sleep, day or night. CPAP is a common device used to treat  obstructive sleep apnea.  · Talk with your provider before taking any pain medicine, muscle relaxants, or sedatives. Your provider will tell you about the possible dangers of taking these medicines.  Date Last Reviewed: 12/1/2016  © 0837-6504 [employer]. [address] [place] [id number]. All rights reserved. This information is not intended as a substitute for professional medical care. Always follow your healthcare professional's instructions.

## 2018-09-20 NOTE — TRANSFER OF CARE
"Anesthesia Transfer of Care Note    Patient: Jamee Armando    Procedure(s) Performed: Procedure(s) (LRB):  MYOMECTOMY, HYSTEROSCOPIC Myosure (N/A)    Patient location: PACU    Anesthesia Type: general    Transport from OR: Transported from OR on room air with adequate spontaneous ventilation    Post pain: adequate analgesia    Post assessment: no apparent anesthetic complications and tolerated procedure well    Post vital signs: stable    Level of consciousness: awake    Nausea/Vomiting: no nausea/vomiting    Complications: none    Transfer of care protocol was followed      Last vitals:   Visit Vitals  /63 (BP Location: Right arm, Patient Position: Lying)   Pulse (!) 59   Temp 37 °C (98.6 °F) (Skin)   Resp 18   Ht 5' 4" (1.626 m)   Wt 89.4 kg (197 lb)   LMP 08/28/2018   SpO2 100%   Breastfeeding? No   BMI 33.81 kg/m²     "

## 2018-09-20 NOTE — DISCHARGE SUMMARY
[facility]  Discharge Note  Short Stay    Admit Date: 9/20/2018    Discharge Date and Time: 9/20/2018     Attending Physician: Tommie Elena MD     Discharge Provider: Tommie Elena    Diagnoses:  Active Hospital Problems    Diagnosis  POA    *Endometrial polyp [N84.0]  Yes      Resolved Hospital Problems   No resolved problems to display.       Discharged Condition: good    Hospital Course: Patient was admitted for an outpatient procedure and tolerated the procedure well with no complications.    Final Diagnoses: Same as principal problem.    Disposition: Home or Self Care    Follow up/Patient Instructions:    Medications:  Reconciled Home Medications:      Medication List      START taking these medications    ibuprofen 800 MG tablet  Commonly known as:  ADVIL,MOTRIN  Take 1 tablet (800 mg total) by mouth every 8 (eight) hours as needed for Pain.     ondansetron 4 MG Tbdl  Commonly known as:  ZOFRAN-ODT  Take 2 tablets (8 mg total) by mouth every 8 (eight) hours as needed.     oxyCODONE-acetaminophen 5-325 mg per tablet  Commonly known as:  PERCOCET  Take 1 tablet by mouth every 4 (four) hours as needed for Pain.     polyethylene glycol 17 gram/dose powder  Commonly known as:  GLYCOLAX  Take 17 g by mouth once daily. In 8 oz of water        CONTINUE taking these medications    diphenhydrAMINE 25 mg capsule  Commonly known as:  BENADRYL  Take 25 mg by mouth every 6 (six) hours as needed for Itching.     naproxen sodium 220 MG tablet  Commonly known as:  ANAPROX  Take 440 mg by mouth every 12 (twelve) hours.          Discharge Procedure Orders   Diet general     Lifting restrictions   Order Comments: No lifting > 30# x 3 weeks postop.     Other restrictions (specify):   Order Comments: Pelvic rest x 6 weeks post op for vaginal procedures (hysterectomy, vaginal repair of cervical cone biopsy)     Call MD for:  temperature >100.4     Call MD for:  persistent nausea and vomiting     Call MD for:   severe uncontrolled pain     Call MD for:  difficulty breathing, headache or visual disturbances     Call MD for:  redness, tenderness, or signs of infection (pain, swelling, redness, odor or green/yellow discharge around incision site)     Call MD for:  hives     Call MD for:  persistent dizziness or light-headedness     Call MD for:  extreme fatigue     Call MD for:   Order Comments: Heavy vaginal bleeding     No dressing needed     Follow-up Information     Tommie Elena MD.    Specialties:  Obstetrics, Obstetrics and Gynecology  Contact information:  [address] [address]  [phone]                   Discharge Procedure Orders (must include Diet, Follow-up, Activity):   Discharge Procedure Orders (must include Diet, Follow-up, Activity)   Diet general     Lifting restrictions   Order Comments: No lifting > 30# x 3 weeks postop.     Other restrictions (specify):   Order Comments: Pelvic rest x 6 weeks post op for vaginal procedures (hysterectomy, vaginal repair of cervical cone biopsy)     Call MD for:  temperature >100.4     Call MD for:  persistent nausea and vomiting     Call MD for:  severe uncontrolled pain     Call MD for:  difficulty breathing, headache or visual disturbances     Call MD for:  redness, tenderness, or signs of infection (pain, swelling, redness, odor or green/yellow discharge around incision site)     Call MD for:  hives     Call MD for:  persistent dizziness or light-headedness     Call MD for:  extreme fatigue     Call MD for:   Order Comments: Heavy vaginal bleeding     No dressing needed

## 2018-09-20 NOTE — ANESTHESIA POSTPROCEDURE EVALUATION
"Anesthesia Post Evaluation    Patient: Jamee Armando    Procedure(s) Performed: Procedure(s) (LRB):  MYOMECTOMY, HYSTEROSCOPIC Myosure (N/A)  POLYPECTOMY    Final Anesthesia Type: general  Patient location during evaluation: PACU  Patient participation: Yes- Able to Participate  Level of consciousness: awake and alert  Post-procedure vital signs: reviewed and stable  Pain management: adequate  Airway patency: patent  PONV status at discharge: No PONV  Anesthetic complications: no      Cardiovascular status: hemodynamically stable and blood pressure returned to baseline  Respiratory status: unassisted, spontaneous ventilation and room air  Hydration status: euvolemic  Follow-up not needed.        Visit Vitals  BP (!) 109/57 (BP Location: Left arm, Patient Position: Lying)   Pulse (!) 56   Temp 36.3 °C (97.4 °F)   Resp 17   Ht 5' 4" (1.626 m)   Wt 89.4 kg (197 lb)   LMP 08/28/2018   SpO2 100%   Breastfeeding? No   BMI 33.81 kg/m²       Pain/Cherelle Score: Pain Assessment Performed: Yes (9/20/2018  1:40 PM)  Presence of Pain: assumed presence of pain (specify) (9/20/2018  1:40 PM)  Pain Rating Prior to Med Admin: 5 (9/20/2018  1:35 PM)  Cherelle Score: 9 (9/20/2018  1:40 PM)        "

## 2018-09-20 NOTE — ANESTHESIA PREPROCEDURE EVALUATION
09/20/2018  Jamee Armando is a 38 y.o., female.    Anesthesia Evaluation      I have reviewed the Medications.     Review of Systems  Anesthesia Hx:  No problems with previous Anesthesia Hx of Anesthetic complications (PONV)   Social:  Non-Smoker, No Alcohol Use    Cardiovascular:  Cardiovascular Normal     Pulmonary:  Pulmonary Normal    Renal/:  Renal/ Normal     Hepatic/GI:   GERD    Neurological:  Neurology Normal    Endocrine:  Endocrine Normal        Physical Exam  General:  Obesity    Airway/Jaw/Neck:  Airway Findings: Mouth Opening: Normal Tongue: Normal  General Airway Assessment: Adult, Average  Mallampati: II  Jaw/Neck Findings:  Neck ROM: Normal ROM       Chest/Lungs:  Chest/Lungs Findings: Clear to auscultation, Normal Respiratory Rate     Heart/Vascular:  Heart Findings: Rate: Normal  Rhythm: Regular Rhythm  Sounds: Normal  Heart murmur: negative       Mental Status:  Mental Status Findings:  Cooperative, Alert and Oriented         Anesthesia Plan  Type of Anesthesia, risks & benefits discussed:  Anesthesia Type:  general  Patient's Preference:   Intra-op Monitoring Plan:   Intra-op Monitoring Plan Comments:   Post Op Pain Control Plan:   Post Op Pain Control Plan Comments:   Induction:   IV  Beta Blocker:  Patient is not currently on a Beta-Blocker (No further documentation required).       Informed Consent: Patient understands risks and agrees with Anesthesia plan.  Questions answered. Anesthesia consent signed with patient.  ASA Score: 2     Day of Surgery Review of History & Physical:            Ready For Surgery From Anesthesia Perspective.

## 2018-09-20 NOTE — INTERVAL H&P NOTE
The patient has been examined and the H&P has been reviewed:    I concur with the findings and no changes have occurred since H&P was written.    Anesthesia/Surgery risks, benefits and alternative options discussed and understood by patient/family.          Active Hospital Problems    Diagnosis  POA    Endometrial polyp [N84.0]  Yes      Resolved Hospital Problems   No resolved problems to display.

## 2018-09-20 NOTE — OP NOTE
OPERATIVE REPORT:  9/20/2018    SURGEON: Tommie Elena M.D.     ASSISTANT: None.     PREOPERATIVE DIAGNOSIS:   1. menometrorrhagia   2. Endometrial polyp    POSTOPERATIVE DIAGNOSIS:   same    OPERATION:   1.Hysteroscopy with resection of endometrial polyp    ANESTHESIA: general endotracheal anesthesia .    ESTIMATED BLOOD LOSS: 25cc.    FLUIDS: 500 mL of crystalloid.    FINDINGS: Normal endometrial cavity with large endometrial polyp at fundus, Tubal ostia visualized. Saline used to insufflate the endometrial cavity.  3cm fundal polyp completely resected with Myosure device, tubal ostia clearly visualized.         PROCEDURE IN DETAIL: Patient was taken to the operating room, where general endotracheal anesthesia was found to be adequate. Patient was prepped and draped in the usual sterile fashion in the dorsal lithotomy position in the candy-cane stirrups. Bivalved speculum was introduced into the vagina and the anterior lip of the cervix grasped with a the single-tooth tenaculum. The cervix was dilated up to admit the myosure hysteroscope. D&C was not performed. The tubal ostia were visualized with difficulty, and the endometrial cavity was clearly visualized throughout without difficultly. There was a 2-3cm endometrial polyp with thin stalk noted at right uterine fundus, compeltely resected with the myosure device. Adequate hemostasis was noted. The endometrial cavity was normal at the completion of the procedure. The hysteroscope was removed and there was excellent hemostasis. The tenaculum was removed from the anterior cervix with hemostasis. All instruments were then removed from the vagina, and the patient was taken out of the [facility], and doing well.      Pathology: endometrial polyp        Tommie Elena M.D., FACOG

## 2018-09-21 VITALS
TEMPERATURE: 97 F | HEIGHT: 64 IN | RESPIRATION RATE: 17 BRPM | DIASTOLIC BLOOD PRESSURE: 57 MMHG | BODY MASS INDEX: 33.63 KG/M2 | SYSTOLIC BLOOD PRESSURE: 109 MMHG | HEART RATE: 56 BPM | OXYGEN SATURATION: 100 % | WEIGHT: 197 LBS

## 2018-09-26 ENCOUNTER — PATIENT MESSAGE (OUTPATIENT)
Dept: OBSTETRICS AND GYNECOLOGY | Facility: CLINIC | Age: 38
End: 2018-09-26

## 2018-10-03 ENCOUNTER — OFFICE VISIT (OUTPATIENT)
Dept: OBSTETRICS AND GYNECOLOGY | Facility: CLINIC | Age: 38
End: 2018-10-03
Payer: COMMERCIAL

## 2018-10-03 VITALS — DIASTOLIC BLOOD PRESSURE: 76 MMHG | SYSTOLIC BLOOD PRESSURE: 122 MMHG | BODY MASS INDEX: 34.4 KG/M2 | WEIGHT: 200.38 LBS

## 2018-10-03 DIAGNOSIS — Z09 POSTOP CHECK: Primary | ICD-10-CM

## 2018-10-03 PROCEDURE — 99999 PR PBB SHADOW E&M-EST. PATIENT-LVL II: CPT | Mod: PBBFAC,,, | Performed by: OBSTETRICS & GYNECOLOGY

## 2018-10-03 PROCEDURE — 99024 POSTOP FOLLOW-UP VISIT: CPT | Mod: S$GLB,,, | Performed by: OBSTETRICS & GYNECOLOGY

## 2018-10-03 NOTE — PROGRESS NOTES
History of Present Illness:   Pateint presents today  2 weeks postop, status post myosure polypectomy, with complaint of none.    Pathology: benign endometrial polyp    Past medical and surgical history reviewed.   I have reviewed the patient's medical history in detail and updated the computerized patient record.    Physical exam:  Vital Signs: as above, reviewed.  Constitutional: She is oriented to person, place, and time. She appears well-developed and well-nourished. No distress.   HENT:   Head: Normocephalic and atraumatic.   Eyes: Conjunctivae and EOM are normal. No scleral icterus.   Neck: Normal range of motion. Neck supple. No tracheal deviation present.   Cardiovascular: Normal rate.    Pulmonary/Chest: Effort normal. No respiratory distress. She exhibits no tenderness.  Breasts: deferred  Abdominal: Soft. She exhibits no distension and no mass. There is no rebound and no guarding.   Genitourinary: Deferred  Musculoskeletal: Normal range of motion.   Lymphadenopathy: No inguinal adenopathy present.   Neurological: She is alert and oriented to person, place, and time. Coordination normal.   Skin: Skin is warm and dry. She is not diaphoretic.   Psychiatric: She has a normal mood and affect.    Assessment:  Normal postop exam    Plan:  Follow up  1 year or as needed

## 2020-02-05 ENCOUNTER — OFFICE VISIT (OUTPATIENT)
Dept: OBSTETRICS AND GYNECOLOGY | Facility: CLINIC | Age: 40
End: 2020-02-05
Payer: COMMERCIAL

## 2020-02-05 VITALS
WEIGHT: 209.69 LBS | BODY MASS INDEX: 35.99 KG/M2 | DIASTOLIC BLOOD PRESSURE: 72 MMHG | SYSTOLIC BLOOD PRESSURE: 118 MMHG

## 2020-02-05 DIAGNOSIS — N89.8 VAGINAL DISCHARGE: Primary | ICD-10-CM

## 2020-02-05 PROCEDURE — 99999 PR PBB SHADOW E&M-EST. PATIENT-LVL III: CPT | Mod: PBBFAC,,, | Performed by: OBSTETRICS & GYNECOLOGY

## 2020-02-05 PROCEDURE — 99213 PR OFFICE/OUTPT VISIT, EST, LEVL III, 20-29 MIN: ICD-10-PCS | Mod: S$GLB,,, | Performed by: OBSTETRICS & GYNECOLOGY

## 2020-02-05 PROCEDURE — 3008F BODY MASS INDEX DOCD: CPT | Mod: CPTII,S$GLB,, | Performed by: OBSTETRICS & GYNECOLOGY

## 2020-02-05 PROCEDURE — 99999 PR PBB SHADOW E&M-EST. PATIENT-LVL III: ICD-10-PCS | Mod: PBBFAC,,, | Performed by: OBSTETRICS & GYNECOLOGY

## 2020-02-05 PROCEDURE — 87481 CANDIDA DNA AMP PROBE: CPT | Mod: 59

## 2020-02-05 PROCEDURE — 3008F PR BODY MASS INDEX (BMI) DOCUMENTED: ICD-10-PCS | Mod: CPTII,S$GLB,, | Performed by: OBSTETRICS & GYNECOLOGY

## 2020-02-05 PROCEDURE — 87661 TRICHOMONAS VAGINALIS AMPLIF: CPT

## 2020-02-05 PROCEDURE — 99213 OFFICE O/P EST LOW 20 MIN: CPT | Mod: S$GLB,,, | Performed by: OBSTETRICS & GYNECOLOGY

## 2020-02-05 NOTE — PROGRESS NOTES
Chief Complaint   Patient presents with    Vaginal Discharge     grey stringy d/c since yesterday. No odor.        History of Present Illness   39 y.o.  female patient presents today for vaginal discharge with minimal symptoms last week or so, started cycle today. Lighter menses since hysteroscopic polypectomy 2018. No intramenstrual bleeding.     Past medical and surgical history reviewed.   I have reviewed the patient's medical history in detail and updated the computerized patient record.    Review of patient's allergies indicates:  No Known Allergies      Review of Systems - Negative except HPI  GEN ROS: negative for - chills or fever  Breast ROS: negative for breast lumps  Genito-Urinary ROS: no dysuria, trouble voiding, or hematuria      Physical Examination:  /72   Wt 95.1 kg (209 lb 10.5 oz)   LMP 01/06/2020   BMI 35.99 kg/m²    Constitutional: She appears alert and responsive. She appears well-developed, well-groomed, and well-nourished. No distress. OverWeight   HENT:   Head: Normocephalic and atraumatic.   Eyes: Conjunctivae and EOM are normal. No scleral icterus.   Neck: Symmetrical. Normal range of motion. Neck supple. No tracheal deviation present. THYROID: without masses or tenderness.  Cardiovascular: Normal rate, no rhythm abnormality noted. Extremities without swelling or edema, warm.    Pulmonary/Chest: Normal respiratory Effort. No distress or retractions. She exhibits no tenderness.  Breasts: are symmetrical.   Right breast exhibits no inverted nipple, no mass, no nipple discharge, no skin change and no tenderness.   Left breast exhibits no inverted nipple, no mass, no nipple discharge, no skin change and no tenderness.  Abdominal: Soft. She exhibits no distension, hernias or masses. There is no tenderness. No enlargement of liver edge or spleen.  There is no rebound and no guarding.   Genitourinary:    External rectal exam shows no thrombosed external hemorrhoids, no lesions.      Pelvic exam was performed with patient supine.   No labial fusion, and symmetrical.    There is no rash, lesion or injury on the right labia.   There is no rash, lesion or injury on the left labia.   No bleeding and no signs of injury around the vaginal introitus, urethral meatus is normal size and without prolapse or lesions, urethra well supported. The cervix is visualized with no discharge, lesions or friability.   No vaginal discharge found.   No significant Cystocele, Enterocele or rectocele, and cervix and uterus well supported.   Bimanual exam:   The urethra is normal to palpation and there are no palpable vaginal wall masses.   Uterus is not deviated, not enlarged, not fixed, normal shape and not tender.   Cervix exhibits no motion tenderness.    Right adnexum displays no mass or nodularity and no tenderness.   Left adnexum displays no mass or nodularity and no tenderness.  Musculoskeletal: Normal range of motion.   Lymphadenopathy: No inguinal adenopathy present.   Neurological: She is alert and oriented to person, place, and time. Coordination normal.   Skin: Skin is warm and dry. She is not diaphoretic. No rashes, lesions or ulcers.   Psychiatric: She has a normal mood and affect, oriented to person, place, and time.              Assessment:  1. Vaginal discharge         Plan:  Affirm, follow up 1-2 month for annual exam and PAP  Patient informed will be contacted with results within 2 weeks. Encouraged to please call back or email if she has not heard from us by then.

## 2020-02-07 LAB
BACTERIAL VAGINOSIS DNA: NEGATIVE
CANDIDA GLABRATA DNA: NEGATIVE
CANDIDA KRUSEI DNA: NEGATIVE
CANDIDA RRNA VAG QL PROBE: NEGATIVE
T VAGINALIS RRNA GENITAL QL PROBE: NEGATIVE

## 2020-04-29 ENCOUNTER — OFFICE VISIT (OUTPATIENT)
Dept: INTERNAL MEDICINE | Facility: CLINIC | Age: 40
End: 2020-04-29

## 2020-04-29 DIAGNOSIS — L03.818 CELLULITIS OF OTHER SPECIFIED SITE: ICD-10-CM

## 2020-04-29 DIAGNOSIS — L25.5 DERMATITIS DUE TO PLANTS, INCLUDING POISON IVY, SUMAC, AND OAK: Primary | ICD-10-CM

## 2020-04-29 PROCEDURE — 99214 OFFICE O/P EST MOD 30 MIN: CPT | Mod: 95,,, | Performed by: NURSE PRACTITIONER

## 2020-04-29 PROCEDURE — 99214 PR OFFICE/OUTPT VISIT, EST, LEVL IV, 30-39 MIN: ICD-10-PCS | Mod: 95,,, | Performed by: NURSE PRACTITIONER

## 2020-04-29 RX ORDER — AMOXICILLIN AND CLAVULANATE POTASSIUM 875; 125 MG/1; MG/1
1 TABLET, FILM COATED ORAL EVERY 12 HOURS
Qty: 14 TABLET | Refills: 0 | Status: SHIPPED | OUTPATIENT
Start: 2020-04-29 | End: 2020-05-06

## 2020-04-29 RX ORDER — SULFAMETHOXAZOLE AND TRIMETHOPRIM 800; 160 MG/1; MG/1
1 TABLET ORAL 2 TIMES DAILY
Qty: 14 TABLET | Refills: 0 | Status: CANCELLED | OUTPATIENT
Start: 2020-04-29 | End: 2020-05-06

## 2020-04-29 RX ORDER — METHYLPREDNISOLONE 4 MG/1
TABLET ORAL
Qty: 1 PACKAGE | Refills: 0 | Status: SHIPPED | OUTPATIENT
Start: 2020-04-29 | End: 2020-05-20

## 2020-04-29 RX ORDER — MUPIROCIN 20 MG/G
OINTMENT TOPICAL DAILY PRN
Qty: 30 G | Refills: 0 | Status: SHIPPED | OUTPATIENT
Start: 2020-04-29 | End: 2021-11-08

## 2020-04-29 NOTE — PROGRESS NOTES
Subjective:    The patient location is: [place]  The chief complaint leading to consultation is: possible bug bites  Visit type: audiovisual  Total time spent with patient: 15 min  Each patient to whom he or she provides medical services by telemedicine is:  (1) informed of the relationship between the physician and patient and the respective role of any other health care provider with respect to management of the patient; and (2) notified that he or she may decline to receive medical services by telemedicine and may withdraw from such care at any time.       Patient ID: Jamee Armando is a 40 y.o. female.    Chief Complaint: No chief complaint on file.    HPI   This is a 40-year-old female patient of Dr. Lorenzo's who is new to me and presents today with complaints of possible bug bites or rash to her arms, torso and legs. Pt reports that she started with sporadic bumps with blisters on her arms, very itchy.  Has spread to other areas now, some areas with patches, other just with red bumps. she has tried cortisol cream and benadryl w/o relief. Pt reports that she has thoroughly checked her bed for bed bugs, but her  doesn't have any bites. She also states that she was outside clearing out her mother's garden prior to rash developing.     Review of Systems   Constitutional: Negative for chills and fever.   Respiratory: Negative for shortness of breath.    Musculoskeletal: Negative for arthralgias and joint swelling.   Skin:        Blistering itchy rash to arms, torso and legs           Objective:   There were no vitals filed for this visit.       Physical Exam   Constitutional: She is oriented to person, place, and time. She appears well-developed and well-nourished. No distress.   HENT:   Head: Normocephalic.   Right Ear: External ear normal.   Left Ear: External ear normal.   Eyes: Conjunctivae and EOM are normal.   Neck: Normal range of motion. Neck supple.   Cardiovascular:   No c/o cp or s/s of distress  noted   Pulmonary/Chest: Effort normal. No respiratory distress.   Musculoskeletal: Normal range of motion.   Neurological: She is alert and oriented to person, place, and time.   Skin: Skin is dry. Rash noted. She is not diaphoretic.   Arms- individual lesions with red raised base and clear blister to center, erythema around sites. No drainage noted   Psychiatric: She has a normal mood and affect. Her behavior is normal. Judgment and thought content normal.       Assessment:       No diagnosis found.    Plan:      Calamine lotion, contact me for any concerns

## 2020-10-09 ENCOUNTER — IMMUNIZATION (OUTPATIENT)
Dept: PHARMACY | Facility: CLINIC | Age: 40
End: 2020-10-09
Payer: COMMERCIAL

## 2020-12-03 ENCOUNTER — OFFICE VISIT (OUTPATIENT)
Dept: FAMILY MEDICINE | Facility: CLINIC | Age: 40
End: 2020-12-03
Payer: COMMERCIAL

## 2020-12-03 VITALS
WEIGHT: 215.5 LBS | HEIGHT: 64 IN | SYSTOLIC BLOOD PRESSURE: 108 MMHG | DIASTOLIC BLOOD PRESSURE: 70 MMHG | HEART RATE: 56 BPM | OXYGEN SATURATION: 99 % | BODY MASS INDEX: 36.79 KG/M2 | TEMPERATURE: 98 F

## 2020-12-03 DIAGNOSIS — Z11.59 NEED FOR HEPATITIS C SCREENING TEST: ICD-10-CM

## 2020-12-03 DIAGNOSIS — K21.9 GASTROESOPHAGEAL REFLUX DISEASE, UNSPECIFIED WHETHER ESOPHAGITIS PRESENT: ICD-10-CM

## 2020-12-03 DIAGNOSIS — Z00.00 LABORATORY EXAM ORDERED AS PART OF ROUTINE GENERAL MEDICAL EXAMINATION: ICD-10-CM

## 2020-12-03 DIAGNOSIS — R10.11 RUQ PAIN: Primary | ICD-10-CM

## 2020-12-03 PROCEDURE — 99214 OFFICE O/P EST MOD 30 MIN: CPT | Mod: S$GLB,,, | Performed by: NURSE PRACTITIONER

## 2020-12-03 PROCEDURE — 99214 PR OFFICE/OUTPT VISIT, EST, LEVL IV, 30-39 MIN: ICD-10-PCS | Mod: S$GLB,,, | Performed by: NURSE PRACTITIONER

## 2020-12-03 RX ORDER — ESOMEPRAZOLE MAGNESIUM 40 MG/1
40 CAPSULE, DELAYED RELEASE ORAL
Qty: 30 CAPSULE | Refills: 11 | Status: SHIPPED | OUTPATIENT
Start: 2020-12-03 | End: 2021-12-31

## 2020-12-03 NOTE — PROGRESS NOTES
"Subjective:       Patient ID: Jamee Armando is a 40 y.o. female.    Chief Complaint: digestive issue and Heartburn    Abdominal Pain  This is a new problem. The current episode started in the past 7 days. The onset quality is gradual. The problem occurs daily. The most recent episode lasted 10 hours. The pain is located in the epigastric region. The pain is at a severity of 3/10. The pain is moderate. The quality of the pain is cramping. Associated symptoms include anorexia and nausea. Pertinent negatives include no arthralgias, constipation, diarrhea, dysuria, headaches, hematuria, myalgias or vomiting. She has tried antacids for the symptoms. Her past medical history is significant for abdominal surgery and irritable bowel syndrome. There is no history of colon cancer, Crohn's disease, gallstones, GERD, pancreatitis, PUD or ulcerative colitis. Patient's medical history includes UTI. Patient's medical history does not include kidney stones.     Review of Systems   Constitutional: Negative for activity change and appetite change.   HENT: Negative for congestion, postnasal drip, rhinorrhea and sinus pressure.    Eyes: Negative for pain and redness.   Respiratory: Negative for choking and chest tightness.    Gastrointestinal: Positive for abdominal pain, anorexia and nausea. Negative for abdominal distention, blood in stool, constipation, diarrhea and vomiting.   Endocrine: Negative for polydipsia and polyphagia.   Genitourinary: Negative for dysuria and hematuria.   Musculoskeletal: Negative for arthralgias and myalgias.   Skin: Negative for color change and rash.   Neurological: Negative for dizziness and headaches.   Psychiatric/Behavioral: Negative for agitation and behavioral problems.       Past medical, surgical, family and social history reviewed.  Objective:     Vitals:    12/03/20 0938   BP: 108/70   Pulse: (!) 56   Temp: 98.2 °F (36.8 °C)   SpO2: 99%   Weight: 97.7 kg (215 lb 8 oz)   Height: 5' 4" (1.626 m) "   PainSc:   3     Body mass index is 36.99 kg/m².     Physical Exam  Constitutional:       General: She is not in acute distress.     Appearance: She is well-developed. She is not diaphoretic.   HENT:      Head: Normocephalic and atraumatic.      Right Ear: Hearing, tympanic membrane, ear canal and external ear normal.      Left Ear: Hearing, tympanic membrane, ear canal and external ear normal.      Nose: Nose normal.      Mouth/Throat:      Pharynx: Uvula midline.   Eyes:      General:         Right eye: No discharge.         Left eye: No discharge.      Conjunctiva/sclera: Conjunctivae normal.      Pupils: Pupils are equal, round, and reactive to light.   Neck:      Musculoskeletal: Normal range of motion and neck supple.      Thyroid: No thyromegaly.      Vascular: No carotid bruit or JVD.      Trachea: Trachea normal.   Cardiovascular:      Rate and Rhythm: Normal rate and regular rhythm.      Heart sounds: No murmur. No friction rub. No gallop.    Pulmonary:      Effort: Pulmonary effort is normal. No respiratory distress.      Breath sounds: Normal breath sounds. No wheezing or rales.   Chest:      Chest wall: No tenderness.   Abdominal:      General: Bowel sounds are normal. There is no distension.      Palpations: Abdomen is soft. There is no mass.      Tenderness: There is abdominal tenderness. There is no guarding or rebound.      Comments: RUQ pain upon palpation   Musculoskeletal: Normal range of motion.   Skin:     General: Skin is warm and dry.   Neurological:      Mental Status: She is alert and oriented to person, place, and time.      Coordination: Coordination normal.   Psychiatric:         Behavior: Behavior normal.         Thought Content: Thought content normal.         Judgment: Judgment normal.         Assessment:       1. RUQ pain    2. Gastroesophageal reflux disease, unspecified whether esophagitis present    3. Need for hepatitis C screening test    4. Laboratory exam ordered as part of  routine general medical examination        Plan:       Jamee was seen today for digestive issue and heartburn.    Diagnoses and all orders for this visit:    RUQ pain  -     US Abdomen Limited; Future    Gastroesophageal reflux disease, unspecified whether esophagitis present  -     esomeprazole (NEXIUM) 40 MG capsule; Take 1 capsule (40 mg total) by mouth before breakfast.    Need for hepatitis C screening test  -     Hepatitis C Antibody; Future    Laboratory exam ordered as part of routine general medical examination  -     CBC Auto Differential; Future  -     Comprehensive Metabolic Panel; Future  -     Hemoglobin A1C; Future  -     Lipid Panel; Future  -     TSH; Future    Other orders

## 2020-12-10 ENCOUNTER — HOSPITAL ENCOUNTER (OUTPATIENT)
Dept: RADIOLOGY | Facility: HOSPITAL | Age: 40
Discharge: HOME | End: 2020-12-10
Attending: NURSE PRACTITIONER
Payer: COMMERCIAL

## 2020-12-10 DIAGNOSIS — R10.11 RUQ PAIN: ICD-10-CM

## 2020-12-10 PROCEDURE — 76705 ECHO EXAM OF ABDOMEN: CPT | Mod: TC,PO

## 2020-12-10 PROCEDURE — 76705 US ABDOMEN LIMITED: ICD-10-PCS | Mod: 26,,, | Performed by: RADIOLOGY

## 2020-12-10 PROCEDURE — 76705 ECHO EXAM OF ABDOMEN: CPT | Mod: 26,,, | Performed by: RADIOLOGY

## 2020-12-11 ENCOUNTER — PATIENT MESSAGE (OUTPATIENT)
Dept: FAMILY MEDICINE | Facility: CLINIC | Age: 40
End: 2020-12-11

## 2020-12-11 DIAGNOSIS — R10.9 ABDOMINAL PAIN, UNSPECIFIED ABDOMINAL LOCATION: Primary | ICD-10-CM

## 2020-12-21 ENCOUNTER — PATIENT OUTREACH (OUTPATIENT)
Dept: ADMINISTRATIVE | Facility: HOSPITAL | Age: 40
End: 2020-12-21

## 2020-12-21 NOTE — PROGRESS NOTES
2020 Care Everywhere updates requested and reviewed.  Immunizations reconciled. Media reports reviewed.  Duplicate HM overrides and  orders removed.  Overdue HM topic chart audit and/or requested.  Overdue lab testing linked to upcoming lab appointments if applies.    LINKS DOWN 2020    Lab kaylyn, and [facility] reviewed   Pap Smear and Lab testing         Health Maintenance Due   Topic Date Due    TETANUS VACCINE  1999    Cervical Cancer Screening  2020

## 2021-01-04 ENCOUNTER — OFFICE VISIT (OUTPATIENT)
Dept: FAMILY MEDICINE | Facility: CLINIC | Age: 41
End: 2021-01-04
Payer: COMMERCIAL

## 2021-01-04 DIAGNOSIS — K21.9 GASTROESOPHAGEAL REFLUX DISEASE, UNSPECIFIED WHETHER ESOPHAGITIS PRESENT: Primary | ICD-10-CM

## 2021-01-04 PROCEDURE — 99213 PR OFFICE/OUTPT VISIT, EST, LEVL III, 20-29 MIN: ICD-10-PCS | Mod: 95,,, | Performed by: NURSE PRACTITIONER

## 2021-01-04 PROCEDURE — 99213 OFFICE O/P EST LOW 20 MIN: CPT | Mod: 95,,, | Performed by: NURSE PRACTITIONER

## 2021-02-27 ENCOUNTER — IMMUNIZATION (OUTPATIENT)
Dept: PRIMARY CARE CLINIC | Facility: CLINIC | Age: 41
End: 2021-02-27
Payer: COMMERCIAL

## 2021-02-27 DIAGNOSIS — Z23 NEED FOR VACCINATION: Primary | ICD-10-CM

## 2021-02-27 PROCEDURE — 91300 COVID-19, MRNA, LNP-S, PF, 30 MCG/0.3 ML DOSE VACCINE: CPT | Mod: S$GLB,,, | Performed by: FAMILY MEDICINE

## 2021-02-27 PROCEDURE — 91300 COVID-19, MRNA, LNP-S, PF, 30 MCG/0.3 ML DOSE VACCINE: ICD-10-PCS | Mod: S$GLB,,, | Performed by: FAMILY MEDICINE

## 2021-02-27 PROCEDURE — 0001A COVID-19, MRNA, LNP-S, PF, 30 MCG/0.3 ML DOSE VACCINE: CPT | Mod: S$GLB,,, | Performed by: FAMILY MEDICINE

## 2021-02-27 PROCEDURE — 0001A COVID-19, MRNA, LNP-S, PF, 30 MCG/0.3 ML DOSE VACCINE: ICD-10-PCS | Mod: S$GLB,,, | Performed by: FAMILY MEDICINE

## 2021-03-24 ENCOUNTER — IMMUNIZATION (OUTPATIENT)
Dept: PRIMARY CARE CLINIC | Facility: CLINIC | Age: 41
End: 2021-03-24

## 2021-03-24 DIAGNOSIS — Z23 NEED FOR VACCINATION: Primary | ICD-10-CM

## 2021-03-24 PROCEDURE — 0002A COVID-19, MRNA, LNP-S, PF, 30 MCG/0.3 ML DOSE VACCINE: CPT | Mod: CV19,S$GLB,, | Performed by: FAMILY MEDICINE

## 2021-03-24 PROCEDURE — 0002A COVID-19, MRNA, LNP-S, PF, 30 MCG/0.3 ML DOSE VACCINE: ICD-10-PCS | Mod: CV19,S$GLB,, | Performed by: FAMILY MEDICINE

## 2021-03-24 PROCEDURE — 91300 COVID-19, MRNA, LNP-S, PF, 30 MCG/0.3 ML DOSE VACCINE: ICD-10-PCS | Mod: S$GLB,,, | Performed by: FAMILY MEDICINE

## 2021-03-24 PROCEDURE — 91300 COVID-19, MRNA, LNP-S, PF, 30 MCG/0.3 ML DOSE VACCINE: CPT | Mod: S$GLB,,, | Performed by: FAMILY MEDICINE

## 2021-11-08 ENCOUNTER — OFFICE VISIT (OUTPATIENT)
Dept: FAMILY MEDICINE | Facility: CLINIC | Age: 41
End: 2021-11-08
Payer: COMMERCIAL

## 2021-11-08 VITALS
WEIGHT: 227.5 LBS | HEART RATE: 68 BPM | HEIGHT: 64 IN | BODY MASS INDEX: 38.84 KG/M2 | RESPIRATION RATE: 20 BRPM | OXYGEN SATURATION: 100 % | SYSTOLIC BLOOD PRESSURE: 118 MMHG | DIASTOLIC BLOOD PRESSURE: 70 MMHG | TEMPERATURE: 98 F

## 2021-11-08 DIAGNOSIS — E66.01 SEVERE OBESITY (BMI 35.0-39.9) WITH COMORBIDITY: ICD-10-CM

## 2021-11-08 DIAGNOSIS — K52.9 GASTROENTERITIS: Primary | ICD-10-CM

## 2021-11-08 PROCEDURE — 90686 FLU VACCINE (QUAD) GREATER THAN OR EQUAL TO 3YO PRESERVATIVE FREE IM: ICD-10-PCS | Mod: S$GLB,,, | Performed by: NURSE PRACTITIONER

## 2021-11-08 PROCEDURE — 90471 IMMUNIZATION ADMIN: CPT | Mod: S$GLB,,, | Performed by: NURSE PRACTITIONER

## 2021-11-08 PROCEDURE — 90471 FLU VACCINE (QUAD) GREATER THAN OR EQUAL TO 3YO PRESERVATIVE FREE IM: ICD-10-PCS | Mod: S$GLB,,, | Performed by: NURSE PRACTITIONER

## 2021-11-08 PROCEDURE — 90686 IIV4 VACC NO PRSV 0.5 ML IM: CPT | Mod: S$GLB,,, | Performed by: NURSE PRACTITIONER

## 2021-11-08 PROCEDURE — 99214 PR OFFICE/OUTPT VISIT, EST, LEVL IV, 30-39 MIN: ICD-10-PCS | Mod: 25,S$GLB,, | Performed by: NURSE PRACTITIONER

## 2021-11-08 PROCEDURE — 99214 OFFICE O/P EST MOD 30 MIN: CPT | Mod: 25,S$GLB,, | Performed by: NURSE PRACTITIONER

## 2021-11-08 RX ORDER — ERYTHROMYCIN 5 MG/G
OINTMENT OPHTHALMIC
COMMUNITY
Start: 2021-10-29 | End: 2023-06-09

## 2021-11-08 RX ORDER — ONDANSETRON HYDROCHLORIDE 8 MG/1
8 TABLET, FILM COATED ORAL 2 TIMES DAILY
Qty: 20 TABLET | Refills: 0 | Status: SHIPPED | OUTPATIENT
Start: 2021-11-08 | End: 2023-06-09

## 2021-11-11 DIAGNOSIS — Z12.31 OTHER SCREENING MAMMOGRAM: ICD-10-CM

## 2021-11-12 PROBLEM — E66.01 SEVERE OBESITY (BMI 35.0-39.9) WITH COMORBIDITY: Status: ACTIVE | Noted: 2021-11-12

## 2021-12-13 ENCOUNTER — PATIENT OUTREACH (OUTPATIENT)
Dept: ADMINISTRATIVE | Facility: OTHER | Age: 41
End: 2021-12-13
Payer: COMMERCIAL

## 2021-12-31 RX ORDER — ESOMEPRAZOLE MAGNESIUM 40 MG/1
40 CAPSULE, DELAYED RELEASE ORAL
Qty: 90 CAPSULE | Refills: 0 | Status: SHIPPED | OUTPATIENT
Start: 2021-12-31 | End: 2023-06-09

## 2022-01-04 ENCOUNTER — LAB VISIT (OUTPATIENT)
Dept: PRIMARY CARE CLINIC | Facility: OTHER | Age: 42
End: 2022-01-04
Payer: COMMERCIAL

## 2022-01-04 ENCOUNTER — PATIENT MESSAGE (OUTPATIENT)
Dept: FAMILY MEDICINE | Facility: CLINIC | Age: 42
End: 2022-01-04
Payer: COMMERCIAL

## 2022-01-04 ENCOUNTER — PATIENT MESSAGE (OUTPATIENT)
Dept: ADMINISTRATIVE | Facility: OTHER | Age: 42
End: 2022-01-04
Payer: COMMERCIAL

## 2022-01-04 DIAGNOSIS — R05.9 COUGH: ICD-10-CM

## 2022-01-04 PROCEDURE — U0003 INFECTIOUS AGENT DETECTION BY NUCLEIC ACID (DNA OR RNA); SEVERE ACUTE RESPIRATORY SYNDROME CORONAVIRUS 2 (SARS-COV-2) (CORONAVIRUS DISEASE [COVID-19]), AMPLIFIED PROBE TECHNIQUE, MAKING USE OF HIGH THROUGHPUT TECHNOLOGIES AS DESCRIBED BY CMS-2020-01-R: HCPCS

## 2022-01-06 NOTE — TELEPHONE ENCOUNTER
Pt was tested 2 days ago, waiting on results . Pt heart rate is running higher than her normal .She is checking her heart rate on her watch .  She is averaging HR 80-90's. Her normal HR is around 65. Pt denies SOB , denies wheezing , denies fever . Pt does state that she has no smell but can taste . --lp

## 2022-01-08 LAB
SARS-COV-2 RNA RESP QL NAA+PROBE: DETECTED
SARS-COV-2- CYCLE NUMBER: 17

## 2022-04-08 ENCOUNTER — PATIENT MESSAGE (OUTPATIENT)
Dept: ADMINISTRATIVE | Facility: HOSPITAL | Age: 42
End: 2022-04-08
Payer: COMMERCIAL

## 2022-04-08 ENCOUNTER — PATIENT OUTREACH (OUTPATIENT)
Dept: ADMINISTRATIVE | Facility: HOSPITAL | Age: 42
End: 2022-04-08
Payer: COMMERCIAL

## 2022-04-08 NOTE — LETTER
April 19, 2022    Jamee Armando  [address] [facility] [address]             [facility]  [address]  [facility] [address]  Phone: [phone] Dear Jamee Armando,      Your [facility] care is dedicated to helping you stay healthy with regular scheduled recommended screenings.  Scheduling routine screenings is important to maintaining good health. Our records indicate that you may be overdue for your screening pap smear.  Pap smear screening can help identify patients at risk for developing cervical cancer at an early stage when it is most likely to be successfully treated.     The current recommendation for a Pap smear screening is every 3-5 years.  We encourage you to schedule your appointment with your women's health provider.   Many women see a Gynecologist for this screening but some primary care providers also provide a Pap smear screening     If you recently had your Pap smear screening outside of [facility], please let your primary care team know so that they can update your health record.  Please send a message to your primary care physician via [email or web address] or contact his/her office at [phone].     If you have questions or want to schedule your screening, please call [phone] or send a message via [email or web address].        Sincerely,     Your Women's Health Care Team           Cande Yadav NP and your [facility] Team

## 2022-04-08 NOTE — PROGRESS NOTES
Non-compliant GAP report chart review - Chart review completed for the following HM test if overdue  (Mammogram, Colonoscopy, Cervical Cancer Screening,  Diabetic lab testing, and/or Dilated EYE EXAM)  04/08/2022    Care Everywhere and Media reports - updates requested and reviewed.        [facility] and [facility] reviewed.  Pap Smear and/or  LABS      DIS reviewed for test needed.  Mammogram        Health Maintenance Due   Topic Date Due    TETANUS VACCINE  Never done    Mammogram  Never done    Cervical Cancer Screening  03/29/2020    COVID-19 Vaccine (3 - Booster for Pfizer series) 08/24/2021

## 2022-04-08 NOTE — LETTER
April 19, 2022    Jamee Armando  [address] [facility] [place] [address]             [facility]  [address]  [place] [address]  Phone: [phone] Dear Jamee,     Your [facility] primary care team is dedicated to assisting you achieve your health goals.  In order to do so, scheduling your routine screenings and tests are key to your good health.  Our records indicate you may be overdue for your mammogram screening.  Mammography screening can help find breast cancer at an early stage, when it is most likely to be successfully treated.    Cande Yadav NP has entered your screening mammogram order.  We encourage you to schedule your appointment at any of our [facility] imaging locations.  You can schedule this Mammogram exam via [facility] or call [phone] and we will be more than happy to assist you in scheduling your mammogram.     If you recently had your mammogram screening outside of [facility], please let your primary care team know so that they can update your health record.  Please send a message to your primary care physician via [email or web address] or contact his/her office at [phone].     Thank you for letting us care for you,        Sincerely,        Your Women's Health Care Team

## 2022-05-31 ENCOUNTER — PATIENT MESSAGE (OUTPATIENT)
Dept: ADMINISTRATIVE | Facility: HOSPITAL | Age: 42
End: 2022-05-31
Payer: COMMERCIAL

## 2022-08-24 ENCOUNTER — PATIENT MESSAGE (OUTPATIENT)
Dept: ADMINISTRATIVE | Facility: HOSPITAL | Age: 42
End: 2022-08-24
Payer: COMMERCIAL

## 2022-10-10 ENCOUNTER — PATIENT MESSAGE (OUTPATIENT)
Dept: ADMINISTRATIVE | Facility: HOSPITAL | Age: 42
End: 2022-10-10
Payer: COMMERCIAL

## 2022-11-14 ENCOUNTER — PATIENT OUTREACH (OUTPATIENT)
Dept: ADMINISTRATIVE | Facility: HOSPITAL | Age: 42
End: 2022-11-14
Payer: COMMERCIAL

## 2022-11-14 NOTE — PROGRESS NOTES
Portal outreach un-read by patient.  Outreach mailed 11/14/2022  CERVICAL CANCER SCREENING    Outreach to patient in reference to a routine PAP SMEAR EXAM    Chart review completed - Care Everywhere and Media reports - updates requested and reviewed.        ISIS sentronics REVIEWED  BREAST CANCER SCREENING    Outreach to patient in reference to SCHEDULING A MAMMOGRAM EXAM.     Chart review completed:   Care Everywhere and Media reports - updates requested and reviewed.        REVIEWED DIS 11/14/2022

## 2022-11-14 NOTE — LETTER
November 14, 2022    Jamee Armando  [address] [facility] [address]             [facility]  [address]  [facility] [address]  Phone: [phone] Dear Erin, [facility] is committed to your overall health and would like to ensure that you are up to date on your recommended test and/or procedures.   Cande Yadav NP has found that your chart shows you may be due for the following:      Health Maintenance Due   Topic    TETANUS VACCINE     Mammogram     Cervical Cancer Screening     COVID-19 Vaccine (3 - Booster for Pfizer series)    Influenza Vaccine (1)         If you have had any of the above done at another facility, please let us know so that we may obtain copies from that facility.  If you have a copy of these records, please provide a copy so that we may update your records.  Also, You are welcome to request that the report be faxed to us at  ([phone]).      If you have an upcoming scheduled appointment for the above test and/or procedures, please disregard this letter.  Otherwise, please contact us through your [facility] portal or by calling [phone] and we will assist in scheduling these appointments for you.      Thank you for letting us care for you,    Sincerely,    Cande Yadav NP and your [facility] Team

## 2023-01-17 ENCOUNTER — PATIENT MESSAGE (OUTPATIENT)
Dept: ADMINISTRATIVE | Facility: HOSPITAL | Age: 43
End: 2023-01-17
Payer: COMMERCIAL

## 2023-04-11 ENCOUNTER — PATIENT MESSAGE (OUTPATIENT)
Dept: ADMINISTRATIVE | Facility: HOSPITAL | Age: 43
End: 2023-04-11
Payer: COMMERCIAL

## 2023-06-09 ENCOUNTER — PATIENT MESSAGE (OUTPATIENT)
Dept: PSYCHIATRY | Facility: CLINIC | Age: 43
End: 2023-06-09
Payer: COMMERCIAL

## 2023-06-09 ENCOUNTER — OFFICE VISIT (OUTPATIENT)
Dept: FAMILY MEDICINE | Facility: CLINIC | Age: 43
End: 2023-06-09
Payer: COMMERCIAL

## 2023-06-09 VITALS
DIASTOLIC BLOOD PRESSURE: 78 MMHG | HEIGHT: 65 IN | BODY MASS INDEX: 40.88 KG/M2 | RESPIRATION RATE: 18 BRPM | SYSTOLIC BLOOD PRESSURE: 114 MMHG | HEART RATE: 69 BPM | OXYGEN SATURATION: 98 % | WEIGHT: 245.38 LBS

## 2023-06-09 DIAGNOSIS — R41.840 CONCENTRATION DEFICIT: ICD-10-CM

## 2023-06-09 DIAGNOSIS — Z00.00 LABORATORY EXAM ORDERED AS PART OF ROUTINE GENERAL MEDICAL EXAMINATION: ICD-10-CM

## 2023-06-09 DIAGNOSIS — E66.9 OBESITY, UNSPECIFIED CLASSIFICATION, UNSPECIFIED OBESITY TYPE, UNSPECIFIED WHETHER SERIOUS COMORBIDITY PRESENT: ICD-10-CM

## 2023-06-09 DIAGNOSIS — Z12.39 ENCOUNTER FOR SCREENING FOR MALIGNANT NEOPLASM OF BREAST, UNSPECIFIED SCREENING MODALITY: ICD-10-CM

## 2023-06-09 DIAGNOSIS — R53.83 FATIGUE, UNSPECIFIED TYPE: ICD-10-CM

## 2023-06-09 DIAGNOSIS — R40.0 HAS DAYTIME DROWSINESS: ICD-10-CM

## 2023-06-09 DIAGNOSIS — Z00.00 ROUTINE PHYSICAL EXAMINATION: Primary | ICD-10-CM

## 2023-06-09 PROCEDURE — 99396 PR PREVENTIVE VISIT,EST,40-64: ICD-10-PCS | Mod: S$GLB,,, | Performed by: NURSE PRACTITIONER

## 2023-06-09 PROCEDURE — 3074F SYST BP LT 130 MM HG: CPT | Mod: CPTII,S$GLB,, | Performed by: NURSE PRACTITIONER

## 2023-06-09 PROCEDURE — 1160F PR REVIEW ALL MEDS BY PRESCRIBER/CLIN PHARMACIST DOCUMENTED: ICD-10-PCS | Mod: CPTII,S$GLB,, | Performed by: NURSE PRACTITIONER

## 2023-06-09 PROCEDURE — 99396 PREV VISIT EST AGE 40-64: CPT | Mod: S$GLB,,, | Performed by: NURSE PRACTITIONER

## 2023-06-09 PROCEDURE — 3008F BODY MASS INDEX DOCD: CPT | Mod: CPTII,S$GLB,, | Performed by: NURSE PRACTITIONER

## 2023-06-09 PROCEDURE — 1160F RVW MEDS BY RX/DR IN RCRD: CPT | Mod: CPTII,S$GLB,, | Performed by: NURSE PRACTITIONER

## 2023-06-09 PROCEDURE — 3008F PR BODY MASS INDEX (BMI) DOCUMENTED: ICD-10-PCS | Mod: CPTII,S$GLB,, | Performed by: NURSE PRACTITIONER

## 2023-06-09 PROCEDURE — 3078F DIAST BP <80 MM HG: CPT | Mod: CPTII,S$GLB,, | Performed by: NURSE PRACTITIONER

## 2023-06-09 PROCEDURE — 1159F PR MEDICATION LIST DOCUMENTED IN MEDICAL RECORD: ICD-10-PCS | Mod: CPTII,S$GLB,, | Performed by: NURSE PRACTITIONER

## 2023-06-09 PROCEDURE — 1159F MED LIST DOCD IN RCRD: CPT | Mod: CPTII,S$GLB,, | Performed by: NURSE PRACTITIONER

## 2023-06-09 PROCEDURE — 3078F PR MOST RECENT DIASTOLIC BLOOD PRESSURE < 80 MM HG: ICD-10-PCS | Mod: CPTII,S$GLB,, | Performed by: NURSE PRACTITIONER

## 2023-06-09 PROCEDURE — 3074F PR MOST RECENT SYSTOLIC BLOOD PRESSURE < 130 MM HG: ICD-10-PCS | Mod: CPTII,S$GLB,, | Performed by: NURSE PRACTITIONER

## 2023-06-09 RX ORDER — SEMAGLUTIDE 0.25 MG/.5ML
0.25 INJECTION, SOLUTION SUBCUTANEOUS
Qty: 12 EACH | Refills: 0 | Status: SHIPPED | OUTPATIENT
Start: 2023-06-09 | End: 2023-07-31

## 2023-06-09 NOTE — PATIENT INSTRUCTIONS
Psychiatric References      [facility]  Puwozypzgi-[phone]  Main Focsue-[phone]

## 2023-06-09 NOTE — PROGRESS NOTES
"Subjective:       Patient ID: Jamee Armando is a 43 y.o. female.    Chief Complaint: Annual Exam  The patient is here for routine physical. Patient is generally feeling well.    Pt has had a difficult year. Not been in for care. Her  was sick and son had an accident.  Constantly tired, snores, daytime drowsiness.   Moves around in bed all night long.  Feels like she cannot concentrate, son with ADHD.  Obese-has not been able to loose weight despite diet and exercise.     HPI  Review of Systems   Constitutional:  Negative for activity change and appetite change.   HENT:  Negative for congestion, postnasal drip, rhinorrhea and sinus pressure.    Eyes:  Negative for pain and redness.   Respiratory:  Negative for choking and chest tightness.    Gastrointestinal:  Negative for abdominal distention, abdominal pain, blood in stool, constipation, diarrhea, nausea and vomiting.   Endocrine: Negative for polydipsia and polyphagia.   Genitourinary:  Negative for dysuria and hematuria.   Musculoskeletal:  Negative for arthralgias and myalgias.   Skin:  Negative for color change and rash.   Neurological:  Negative for dizziness and headaches.   Psychiatric/Behavioral:  Negative for agitation and behavioral problems.      Past medical, surgical, family and social history reviewed.  Objective:     Vitals:    06/09/23 0739   BP: 114/78   Pulse: 69   Resp: 18   SpO2: 98%   Weight: 111.3 kg (245 lb 6 oz)   Height: 5' 5.25" (1.657 m)   PainSc: 0-No pain     Body mass index is 40.52 kg/m².     Physical Exam  Constitutional:       General: She is not in acute distress.     Appearance: She is well-developed. She is obese. She is not diaphoretic.   HENT:      Head: Normocephalic and atraumatic.      Right Ear: Hearing, tympanic membrane, ear canal and external ear normal.      Left Ear: Hearing, tympanic membrane, ear canal and external ear normal.      Nose: Nose normal.      Mouth/Throat:      Pharynx: Uvula midline.   Eyes:      " General:         Right eye: No discharge.         Left eye: No discharge.      Conjunctiva/sclera: Conjunctivae normal.      Pupils: Pupils are equal, round, and reactive to light.   Neck:      Thyroid: No thyromegaly.      Vascular: No carotid bruit or JVD.      Trachea: Trachea normal.   Cardiovascular:      Rate and Rhythm: Normal rate and regular rhythm.      Heart sounds: No murmur heard.    No friction rub. No gallop.   Pulmonary:      Effort: Pulmonary effort is normal. No respiratory distress.      Breath sounds: Normal breath sounds. No wheezing or rales.   Chest:      Chest wall: No tenderness.   Abdominal:      General: Bowel sounds are normal. There is no distension.      Palpations: Abdomen is soft. There is no mass.      Tenderness: There is no abdominal tenderness. There is no guarding or rebound.   Musculoskeletal:         General: Normal range of motion.      Cervical back: Normal range of motion and neck supple.   Skin:     General: Skin is warm and dry.   Neurological:      Mental Status: She is alert and oriented to person, place, and time.      Coordination: Coordination normal.   Psychiatric:         Behavior: Behavior normal.         Thought Content: Thought content normal.         Judgment: Judgment normal.       Assessment:       1. Routine physical examination    2. Has daytime drowsiness    3. Encounter for screening for malignant neoplasm of breast, unspecified screening modality    4. Laboratory exam ordered as part of routine general medical examination    5. Fatigue, unspecified type    6. Concentration deficit        Plan:       Jamee was seen today for annual exam.    Diagnoses and all orders for this visit:    Routine physical examination    Has daytime drowsiness  -     Polysomnogram (CPAP will be added if patient meets diagnostic criteria.); Future  -     Home Sleep Study; Future  -     Ambulatory referral/consult to Sleep Disorders; Future    Encounter for screening for malignant  neoplasm of breast, unspecified screening modality  -     Mammo Digital Screening Bilat w/ Deion; Future    Laboratory exam ordered as part of routine general medical examination  -     CBC Auto Differential; Future  -     Comprehensive Metabolic Panel; Future  -     Hemoglobin A1C; Future  -     Lipid Panel; Future  -     TSH; Future    Fatigue, unspecified type  -     Iron and TIBC; Future  -     Ferritin; Future  -     Vitamin B12; Future  -     Vitamin D; Future    Concentration deficit  -     Ambulatory referral/consult to Psychiatry; Future    Obesity, unspecified classification, unspecified obesity type, unspecified whether serious comorbidity present  -     semaglutide, weight loss, (WEGOVY) 0.25 mg/0.5 mL PnIj; Inject 0.25 mg into the skin every 7 days.

## 2023-06-14 ENCOUNTER — HOSPITAL ENCOUNTER (OUTPATIENT)
Dept: RADIOLOGY | Facility: HOSPITAL | Age: 43
Discharge: HOME | End: 2023-06-14
Attending: NURSE PRACTITIONER
Payer: COMMERCIAL

## 2023-06-14 DIAGNOSIS — Z12.31 BREAST CANCER SCREENING BY MAMMOGRAM: ICD-10-CM

## 2023-06-14 DIAGNOSIS — Z12.39 ENCOUNTER FOR SCREENING FOR MALIGNANT NEOPLASM OF BREAST, UNSPECIFIED SCREENING MODALITY: ICD-10-CM

## 2023-06-14 PROCEDURE — 77067 SCR MAMMO BI INCL CAD: CPT | Mod: 26,,, | Performed by: RADIOLOGY

## 2023-06-14 PROCEDURE — 77067 SCR MAMMO BI INCL CAD: CPT | Mod: TC,PO

## 2023-06-14 PROCEDURE — 77063 MAMMO DIGITAL SCREENING BILAT WITH TOMO: ICD-10-PCS | Mod: 26,,, | Performed by: RADIOLOGY

## 2023-06-14 PROCEDURE — 77063 BREAST TOMOSYNTHESIS BI: CPT | Mod: 26,,, | Performed by: RADIOLOGY

## 2023-06-14 PROCEDURE — 77067 MAMMO DIGITAL SCREENING BILAT WITH TOMO: ICD-10-PCS | Mod: 26,,, | Performed by: RADIOLOGY

## 2023-06-21 ENCOUNTER — PATIENT MESSAGE (OUTPATIENT)
Dept: FAMILY MEDICINE | Facility: CLINIC | Age: 43
End: 2023-06-21
Payer: COMMERCIAL

## 2023-07-31 ENCOUNTER — OFFICE VISIT (OUTPATIENT)
Dept: OBSTETRICS AND GYNECOLOGY | Facility: CLINIC | Age: 43
End: 2023-07-31
Payer: COMMERCIAL

## 2023-07-31 VITALS
DIASTOLIC BLOOD PRESSURE: 70 MMHG | WEIGHT: 245.81 LBS | SYSTOLIC BLOOD PRESSURE: 102 MMHG | BODY MASS INDEX: 40.96 KG/M2 | HEIGHT: 65 IN

## 2023-07-31 DIAGNOSIS — Z01.419 GYNECOLOGIC EXAM NORMAL: Primary | ICD-10-CM

## 2023-07-31 PROCEDURE — 3008F PR BODY MASS INDEX (BMI) DOCUMENTED: ICD-10-PCS | Mod: CPTII,S$GLB,, | Performed by: OBSTETRICS & GYNECOLOGY

## 2023-07-31 PROCEDURE — 3044F HG A1C LEVEL LT 7.0%: CPT | Mod: CPTII,S$GLB,, | Performed by: OBSTETRICS & GYNECOLOGY

## 2023-07-31 PROCEDURE — 99386 PREV VISIT NEW AGE 40-64: CPT | Mod: S$GLB,,, | Performed by: OBSTETRICS & GYNECOLOGY

## 2023-07-31 PROCEDURE — 99386 PR PREVENTIVE VISIT,NEW,40-64: ICD-10-PCS | Mod: S$GLB,,, | Performed by: OBSTETRICS & GYNECOLOGY

## 2023-07-31 PROCEDURE — 99999 PR PBB SHADOW E&M-EST. PATIENT-LVL III: ICD-10-PCS | Mod: PBBFAC,,, | Performed by: OBSTETRICS & GYNECOLOGY

## 2023-07-31 PROCEDURE — 1159F MED LIST DOCD IN RCRD: CPT | Mod: CPTII,S$GLB,, | Performed by: OBSTETRICS & GYNECOLOGY

## 2023-07-31 PROCEDURE — 3078F DIAST BP <80 MM HG: CPT | Mod: CPTII,S$GLB,, | Performed by: OBSTETRICS & GYNECOLOGY

## 2023-07-31 PROCEDURE — 3074F SYST BP LT 130 MM HG: CPT | Mod: CPTII,S$GLB,, | Performed by: OBSTETRICS & GYNECOLOGY

## 2023-07-31 PROCEDURE — 1159F PR MEDICATION LIST DOCUMENTED IN MEDICAL RECORD: ICD-10-PCS | Mod: CPTII,S$GLB,, | Performed by: OBSTETRICS & GYNECOLOGY

## 2023-07-31 PROCEDURE — 3044F PR MOST RECENT HEMOGLOBIN A1C LEVEL <7.0%: ICD-10-PCS | Mod: CPTII,S$GLB,, | Performed by: OBSTETRICS & GYNECOLOGY

## 2023-07-31 PROCEDURE — 87624 HPV HI-RISK TYP POOLED RSLT: CPT | Performed by: OBSTETRICS & GYNECOLOGY

## 2023-07-31 PROCEDURE — 3008F BODY MASS INDEX DOCD: CPT | Mod: CPTII,S$GLB,, | Performed by: OBSTETRICS & GYNECOLOGY

## 2023-07-31 PROCEDURE — 3074F PR MOST RECENT SYSTOLIC BLOOD PRESSURE < 130 MM HG: ICD-10-PCS | Mod: CPTII,S$GLB,, | Performed by: OBSTETRICS & GYNECOLOGY

## 2023-07-31 PROCEDURE — 3078F PR MOST RECENT DIASTOLIC BLOOD PRESSURE < 80 MM HG: ICD-10-PCS | Mod: CPTII,S$GLB,, | Performed by: OBSTETRICS & GYNECOLOGY

## 2023-07-31 PROCEDURE — 99999 PR PBB SHADOW E&M-EST. PATIENT-LVL III: CPT | Mod: PBBFAC,,, | Performed by: OBSTETRICS & GYNECOLOGY

## 2023-07-31 PROCEDURE — 88175 CYTOPATH C/V AUTO FLUID REDO: CPT | Performed by: OBSTETRICS & GYNECOLOGY

## 2023-07-31 NOTE — PROGRESS NOTES
"Chief Complaint   Patient presents with    [facility]    Establish Care       History and Physical:  Patient's last menstrual period was 2023 (approximate).       Jamee Armando is a 43 y.o.   female who presents today for her routine annual GYN exam. The patient has no Gynecology complaints today. Menses monthly, better than prior to ablation, counseled .      Allergies: Review of patient's allergies indicates:  No Known Allergies    Past Medical History:   Diagnosis Date    PONV (postoperative nausea and vomiting)     Thyroid disease     Pt states that "doctor informed her that her levels were elevated, but not high enough to require medications."       Past Surgical History:   Procedure Laterality Date    breast reduction  2003     SECTION  2008    x2    HYSTEROSCOPIC RESECTION OF MYOMA N/A 2018    Procedure: MYOMECTOMY, HYSTEROSCOPIC Myosure;  Surgeon: Tommie Elena MD;  Location: [facility];  Service: OB/GYN;  Laterality: N/A;  needs myosure Rep    TONSILLECTOMY, ADENOIDECTOMY      Pt states that her adenoids were not removed.    TOTAL REDUCTION MAMMOPLASTY         MEDS:   Current Outpatient Medications on File Prior to Visit   Medication Sig Dispense Refill    semaglutide, weight loss, (WEGOVY) 0.25 mg/0.5 mL PnIj Inject 0.25 mg into the skin every 7 days. 12 each 0     No current facility-administered medications on file prior to visit.       OB History          2    Para   2    Term   2            AB        Living   2         SAB        IAB        Ectopic        Multiple        Live Births   2                 Social History     Socioeconomic History    Marital status:    Tobacco Use    Smoking status: Never    Smokeless tobacco: Never   Substance and Sexual Activity    Alcohol use: No    Drug use: No    Sexual activity: Yes     Partners: Male     Birth control/protection: Surgical     Comment: tubal ligation     Social Determinants of Health "     Financial Resource Strain: Low Risk  (12/3/2020)    Overall Financial Resource Strain (CARDIA)     Difficulty of Paying Living Expenses: Not hard at all   Food Insecurity: No Food Insecurity (12/3/2020)    Hunger Vital Sign     Worried About Running Out of Food in the Last Year: Never true     Ran Out of Food in the Last Year: Never true   Transportation Needs: No Transportation Needs (12/3/2020)    PRAPARE - Transportation     Lack of Transportation (Medical): No     Lack of Transportation (Non-Medical): No   Physical Activity: Insufficiently Active (12/3/2020)    Exercise Vital Sign     Days of Exercise per Week: 4 days     Minutes of Exercise per Session: 30 min   Stress: Stress Concern Present (12/3/2020)    [employer] [place] [employer] - Occupational Stress Questionnaire     Feeling of Stress : Rather much   Social Connections: Unknown (12/3/2020)    Social Connection and Isolation Panel [NHANES]     Frequency of Communication with Friends and Family: More than three times a week     Frequency of Social Gatherings with Friends and Family: Twice a week     Active Member of Clubs or Organizations: Yes     Attends Club or Organization Meetings: More than 4 times per year     Marital Status:        Family History   Problem Relation Age of Onset    Cancer Paternal Grandfather         pancreatic    Alzheimer's disease Paternal Grandmother     Stroke Maternal Grandmother     Cancer Maternal Grandfather     Heart attack Father     Ovarian cancer Neg Hx     Breast cancer Neg Hx          Past medical and surgical history reviewed.   I have reviewed the patient's medical history in detail and updated the computerized patient record.    Review of System:   General: no chills, fever, night sweats, weight gain or weight loss  Psychological: no depression or suicidal ideation  Breasts: no new or changing breast lumps, nipple discharge or masses.  Respiratory: no cough, shortness of breath, or  "wheezing  Cardiovascular: no chest pain or dyspnea on exertion  Gastrointestinal: no abdominal pain, change in bowel habits, or black or bloody stools  Genito-Urinary: no incontinence, urinary frequency/urgency or vulvar/vaginal symptoms, pelvic pain or abnormal vaginal bleeding.  Musculoskeletal: no gait disturbance or muscular weakness      Physical Exam:   /70   Ht 5' 5.25" (1.657 m)   Wt 111.5 kg (245 lb 13 oz)   LMP 07/29/2023 (Approximate)   BMI 40.59 kg/m²   Constitutional: She appears alert and responsive. She appears well-developed, well-groomed, and well-nourished. No distress. OverWeight   HENT:   Head: Normocephalic and atraumatic.   Eyes: Conjunctivae and EOM are normal. No scleral icterus.   Neck: Symmetrical. Normal range of motion. Neck supple. No tracheal deviation present.   Cardiovascular: Normal rate, no rhythm abnormality noted. Extremities without swelling or edema, warm.    Pulmonary/Chest: Normal respiratory Effort. No distress or retractions. She exhibits no tenderness.  Breasts: are symmetrical. Well healed reduction scars.    Right breast exhibits no inverted nipple, no mass, no nipple discharge, no skin change and no tenderness.   Left breast exhibits no inverted nipple, no mass, no nipple discharge, no skin change and no tenderness.  Abdominal: Soft. She exhibits no distension, hernias or masses. There is no tenderness. No enlargement of liver edge or spleen.  There is no rebound and no guarding.   Genitourinary:    External rectal exam shows no thrombosed external hemorrhoids, no lesions.     Pelvic exam was performed with patient supine.   No labial fusion, and symmetrical.    There is no rash, lesion or injury on the right labia.   There is no rash, lesion or injury on the left labia.   No bleeding and no signs of injury around the vaginal introitus, urethral meatus is normal size and without prolapse or lesions, urethra well supported. The cervix is visualized with no " discharge, lesions or friability.   No vaginal discharge found.    No significant Cystocele, Enterocele or rectocele, and cervix and uterus well supported.   Bimanual exam:   The urethra is normal to palpation and there are no palpable vaginal wall masses.   Uterus is not deviated, not enlarged, not fixed, normal shape and not tender.   Cervix exhibits no motion tenderness.    Right adnexum displays no mass or nodularity and no tenderness.   Left adnexum displays no mass or nodularity and no tenderness.  Musculoskeletal: Normal range of motion.   Neurological: She is alert and oriented to person, place, and time. Coordination normal.   Skin: Skin is warm and dry. She is not diaphoretic. No rashes, lesions or ulcers.   Psychiatric: She has a normal mood and affect, oriented to person, place, and time.      Assessment:   Normal annual GYN exam      Plan:   PAP  Mammogram  Follow up in 1 year.  Patient informed will be contacted with results within 2 weeks. Encouraged to please call back or email if she has not heard from us by then.

## 2023-08-03 LAB
HPV HR 12 DNA SPEC QL NAA+PROBE: NEGATIVE
HPV16 AG SPEC QL: NEGATIVE
HPV18 DNA SPEC QL NAA+PROBE: NEGATIVE

## 2023-08-04 LAB
FINAL PATHOLOGIC DIAGNOSIS: NORMAL
Lab: NORMAL

## 2024-06-13 ENCOUNTER — OFFICE VISIT (OUTPATIENT)
Dept: FAMILY MEDICINE | Facility: CLINIC | Age: 44
End: 2024-06-13
Payer: COMMERCIAL

## 2024-06-13 ENCOUNTER — TELEPHONE (OUTPATIENT)
Dept: FAMILY MEDICINE | Facility: CLINIC | Age: 44
End: 2024-06-13

## 2024-06-13 VITALS
HEIGHT: 65 IN | WEIGHT: 224.44 LBS | TEMPERATURE: 98 F | BODY MASS INDEX: 37.39 KG/M2 | SYSTOLIC BLOOD PRESSURE: 98 MMHG | HEART RATE: 97 BPM | OXYGEN SATURATION: 99 % | DIASTOLIC BLOOD PRESSURE: 70 MMHG

## 2024-06-13 DIAGNOSIS — Z00.00 LABORATORY EXAM ORDERED AS PART OF ROUTINE GENERAL MEDICAL EXAMINATION: ICD-10-CM

## 2024-06-13 DIAGNOSIS — Z00.00 ANNUAL PHYSICAL EXAM: Primary | ICD-10-CM

## 2024-06-13 DIAGNOSIS — Z23 IMMUNIZATION DUE: ICD-10-CM

## 2024-06-13 DIAGNOSIS — F98.8 ATTENTION DEFICIT DISORDER (ADD) WITHOUT HYPERACTIVITY: ICD-10-CM

## 2024-06-13 DIAGNOSIS — E66.01 SEVERE OBESITY (BMI 35.0-39.9) WITH COMORBIDITY: ICD-10-CM

## 2024-06-13 PROCEDURE — 99396 PREV VISIT EST AGE 40-64: CPT | Mod: 25,S$GLB,, | Performed by: NURSE PRACTITIONER

## 2024-06-13 PROCEDURE — 90715 TDAP VACCINE 7 YRS/> IM: CPT | Mod: S$GLB,,, | Performed by: NURSE PRACTITIONER

## 2024-06-13 PROCEDURE — 90471 IMMUNIZATION ADMIN: CPT | Mod: S$GLB,,, | Performed by: NURSE PRACTITIONER

## 2024-06-13 RX ORDER — DEXTROAMPHETAMINE SACCHARATE, AMPHETAMINE ASPARTATE, DEXTROAMPHETAMINE SULFATE AND AMPHETAMINE SULFATE 2.5; 2.5; 2.5; 2.5 MG/1; MG/1; MG/1; MG/1
1 TABLET ORAL 3 TIMES DAILY
COMMUNITY
Start: 2024-05-20

## 2024-06-13 RX ORDER — HYDROXYZINE HYDROCHLORIDE 25 MG/1
25 TABLET, FILM COATED ORAL
COMMUNITY
Start: 2024-05-01

## 2024-06-13 RX ORDER — ONDANSETRON 8 MG/1
8 TABLET, ORALLY DISINTEGRATING ORAL 2 TIMES DAILY PRN
COMMUNITY
Start: 2024-04-16

## 2024-06-13 NOTE — PROGRESS NOTES
"Subjective:       Patient ID: Jamee Armando is a 44 y.o. female.    Chief Complaint: medical form     HPI here for physical. She has physical form to fill out for [employer]. She will be going to [place] with her son. She has participated in Camp in the past. She will not be doing high impact activities.     She is due for routine labs. She needs her Tdap vaccine. She is due for her appointment with Dr. Elena. We can help her schedule that. She will have mammogram at that appointment.     No concerns today. See ROS    The following portion of the patients history was reviewed and updated as appropriate: allergies, current medications, past medical and surgical history. Past social history and problem list reviewed. Family PMH and Past social history reviewed. Tobacco, Illicit drug use reviewed.      Review of patient's allergies indicates:  No Known Allergies      Current Outpatient Medications:     dextroamphetamine-amphetamine 10 mg Tab, Take 1 tablet by mouth 3 (three) times daily., Disp: , Rfl:     hydrOXYzine HCL (ATARAX) 25 MG tablet, Take 25 mg by mouth., Disp: , Rfl:     ondansetron (ZOFRAN-ODT) 8 MG TbDL, 8 mg 2 (two) times daily as needed., Disp: , Rfl:     Past Medical History:   Diagnosis Date    ADHD (attention deficit hyperactivity disorder)     PONV (postoperative nausea and vomiting)     Thyroid disease     Pt states that "doctor informed her that her levels were elevated, but not high enough to require medications."       Past Surgical History:   Procedure Laterality Date    breast reduction       SECTION  2008    x2    HYSTEROSCOPIC RESECTION OF MYOMA N/A 2018    Procedure: MYOMECTOMY, HYSTEROSCOPIC Myosure;  Surgeon: Tommie Elena MD;  Location: [facility];  Service: OB/GYN;  Laterality: N/A;  needs myosure Rep    TONSILLECTOMY, ADENOIDECTOMY      Pt states that her adenoids were not removed.    TOTAL REDUCTION MAMMOPLASTY         Social History     Socioeconomic " History    Marital status:    Tobacco Use    Smoking status: Never    Smokeless tobacco: Never   Substance and Sexual Activity    Alcohol use: No    Drug use: No    Sexual activity: Yes     Partners: Male     Birth control/protection: Surgical     Comment: tubal ligation     Social Determinants of Health     Financial Resource Strain: Low Risk  (12/3/2020)    Overall Financial Resource Strain (CARDIA)     Difficulty of Paying Living Expenses: Not hard at all   Food Insecurity: No Food Insecurity (12/3/2020)    Hunger Vital Sign     Worried About Running Out of Food in the Last Year: Never true     Ran Out of Food in the Last Year: Never true   Transportation Needs: No Transportation Needs (12/3/2020)    PRAPARE - Transportation     Lack of Transportation (Medical): No     Lack of Transportation (Non-Medical): No   Physical Activity: Insufficiently Active (12/3/2020)    Exercise Vital Sign     Days of Exercise per Week: 4 days     Minutes of Exercise per Session: 30 min   Stress: Stress Concern Present (12/3/2020)    [employer] - Occupational Stress Questionnaire     Feeling of Stress : Rather much     Review of Systems   Constitutional:  Negative for fatigue and fever.   HENT: Negative.     Eyes:  Negative for visual disturbance.   Respiratory:  Negative for cough, chest tightness, shortness of breath and wheezing.    Cardiovascular:  Negative for chest pain, palpitations and leg swelling.   Gastrointestinal:  Negative for abdominal pain, blood in stool, diarrhea, nausea and vomiting.   Genitourinary: Negative.    Musculoskeletal:  Negative for arthralgias, back pain and gait problem.   Skin: Negative.    Neurological:  Negative for headaches.   Psychiatric/Behavioral:  Negative for dysphoric mood and sleep disturbance. The patient is not nervous/anxious.        Objective:      BP 98/70 (BP Location: Left arm, Patient Position: Sitting, BP Method: Large (Manual))   Pulse 97    "Temp 98.4 °F (36.9 °C)   Ht 5' 5.25" (1.657 m)   Wt 101.8 kg (224 lb 6.9 oz)   LMP 05/29/2024 (Exact Date)   SpO2 99%   BMI 37.06 kg/m²      Physical Exam  Constitutional:       Appearance: Normal appearance. She is obese.   HENT:      Head: Normocephalic.   Eyes:      Pupils: Pupils are equal, round, and reactive to light.   Neck:      Thyroid: No thyromegaly.      Vascular: No carotid bruit.   Cardiovascular:      Rate and Rhythm: Normal rate and regular rhythm.      Pulses: Normal pulses.      Heart sounds: Normal heart sounds. No murmur heard.  Pulmonary:      Effort: Pulmonary effort is normal.      Breath sounds: Normal breath sounds. No wheezing.   Abdominal:      General: Bowel sounds are normal.      Tenderness: There is no abdominal tenderness.   Musculoskeletal:         General: Normal range of motion.      Cervical back: Normal range of motion.      Right lower leg: No edema.      Left lower leg: No edema.      Comments: Gait normal.  strong, equal   Skin:     General: Skin is warm and dry.      Capillary Refill: Capillary refill takes less than 2 seconds.   Neurological:      General: No focal deficit present.      Mental Status: She is alert.   Psychiatric:         Attention and Perception: Attention and perception normal.         Mood and Affect: Mood and affect normal.         Speech: Speech normal.         Behavior: Behavior normal.         Assessment:       1. Annual physical exam    2. Laboratory exam ordered as part of routine general medical examination    3. Immunization due    4. Severe obesity (BMI 35.0-39.9) with comorbidity    5. Attention deficit disorder (ADD) without hyperactivity        Plan:       Annual physical exam:  form completed. She can participate in camp. She does not meet criteria for high impact activities, but states she does not plan to do any of those activities.     Laboratory exam ordered as part of routine general medical examination  -     TSH; Future; " Expected date: 06/13/2024  -     Hemoglobin A1C; Future; Expected date: 06/13/2024  -     Lipid Panel; Future; Expected date: 06/13/2024  -     Comprehensive Metabolic Panel; Future; Expected date: 06/13/2024  -     CBC Auto Differential; Future; Expected date: 06/13/2024    Immunization due  -     Tdap (BOOSTRIX) vaccine injection 0.5 mL    Severe obesity (BMI 35.0-39.9) with comorbidity: doing well with her weight loss efforts.     Attention deficit disorder (ADD) without hyperactivity: stable on current dose of Adderall.  Followed by Psychiatry provider.      Continue current medication  Take medications only as prescribed  Healthy diet, exercise  Adequate rest  Adequate hydration  Avoid allergens  Avoid excessive caffeine     Follow up with PCP as needed.

## 2024-06-18 ENCOUNTER — LAB VISIT (OUTPATIENT)
Dept: LAB | Facility: HOSPITAL | Age: 44
End: 2024-06-18
Attending: NURSE PRACTITIONER
Payer: COMMERCIAL

## 2024-06-18 DIAGNOSIS — Z00.00 LABORATORY EXAM ORDERED AS PART OF ROUTINE GENERAL MEDICAL EXAMINATION: ICD-10-CM

## 2024-06-18 LAB
ALBUMIN SERPL BCP-MCNC: 3.7 G/DL (ref 3.5–5.2)
ALP SERPL-CCNC: 59 U/L (ref 55–135)
ALT SERPL W/O P-5'-P-CCNC: 45 U/L (ref 10–44)
ANION GAP SERPL CALC-SCNC: 9 MMOL/L (ref 8–16)
AST SERPL-CCNC: 29 U/L (ref 10–40)
BASOPHILS # BLD AUTO: 0.1 K/UL (ref 0–0.2)
BASOPHILS NFR BLD: 1.2 % (ref 0–1.9)
BILIRUB SERPL-MCNC: 0.8 MG/DL (ref 0.1–1)
BUN SERPL-MCNC: 9 MG/DL (ref 6–20)
CALCIUM SERPL-MCNC: 9.1 MG/DL (ref 8.7–10.5)
CHLORIDE SERPL-SCNC: 106 MMOL/L (ref 95–110)
CHOLEST SERPL-MCNC: 145 MG/DL (ref 120–199)
CHOLEST/HDLC SERPL: 3.1 {RATIO} (ref 2–5)
CO2 SERPL-SCNC: 23 MMOL/L (ref 23–29)
CREAT SERPL-MCNC: 0.9 MG/DL (ref 0.5–1.4)
DIFFERENTIAL METHOD BLD: NORMAL
EOSINOPHIL # BLD AUTO: 0.4 K/UL (ref 0–0.5)
EOSINOPHIL NFR BLD: 4.4 % (ref 0–8)
ERYTHROCYTE [DISTWIDTH] IN BLOOD BY AUTOMATED COUNT: 12.5 % (ref 11.5–14.5)
EST. GFR  (NO RACE VARIABLE): >60 ML/MIN/1.73 M^2
ESTIMATED AVG GLUCOSE: 91 MG/DL (ref 68–131)
GLUCOSE SERPL-MCNC: 85 MG/DL (ref 70–110)
HBA1C MFR BLD: 4.8 % (ref 4–5.6)
HCT VFR BLD AUTO: 42.8 % (ref 37–48.5)
HDLC SERPL-MCNC: 47 MG/DL (ref 40–75)
HDLC SERPL: 32.4 % (ref 20–50)
HGB BLD-MCNC: 14.4 G/DL (ref 12–16)
IMM GRANULOCYTES # BLD AUTO: 0.03 K/UL (ref 0–0.04)
IMM GRANULOCYTES NFR BLD AUTO: 0.4 % (ref 0–0.5)
LDLC SERPL CALC-MCNC: 78.4 MG/DL (ref 63–159)
LYMPHOCYTES # BLD AUTO: 2 K/UL (ref 1–4.8)
LYMPHOCYTES NFR BLD: 24 % (ref 18–48)
MCH RBC QN AUTO: 30.3 PG (ref 27–31)
MCHC RBC AUTO-ENTMCNC: 33.6 G/DL (ref 32–36)
MCV RBC AUTO: 90 FL (ref 82–98)
MONOCYTES # BLD AUTO: 0.8 K/UL (ref 0.3–1)
MONOCYTES NFR BLD: 8.9 % (ref 4–15)
NEUTROPHILS # BLD AUTO: 5.1 K/UL (ref 1.8–7.7)
NEUTROPHILS NFR BLD: 61.1 % (ref 38–73)
NONHDLC SERPL-MCNC: 98 MG/DL
NRBC BLD-RTO: 0 /100 WBC
PLATELET # BLD AUTO: 275 K/UL (ref 150–450)
PMV BLD AUTO: 10.5 FL (ref 9.2–12.9)
POTASSIUM SERPL-SCNC: 4.2 MMOL/L (ref 3.5–5.1)
PROT SERPL-MCNC: 6.6 G/DL (ref 6–8.4)
RBC # BLD AUTO: 4.75 M/UL (ref 4–5.4)
SODIUM SERPL-SCNC: 138 MMOL/L (ref 136–145)
T4 FREE SERPL-MCNC: 0.95 NG/DL (ref 0.71–1.51)
TRIGL SERPL-MCNC: 98 MG/DL (ref 30–150)
TSH SERPL DL<=0.005 MIU/L-ACNC: 4.19 UIU/ML (ref 0.4–4)
WBC # BLD AUTO: 8.4 K/UL (ref 3.9–12.7)

## 2024-06-18 PROCEDURE — 83036 HEMOGLOBIN GLYCOSYLATED A1C: CPT | Performed by: NURSE PRACTITIONER

## 2024-06-18 PROCEDURE — 36415 COLL VENOUS BLD VENIPUNCTURE: CPT | Mod: PO | Performed by: NURSE PRACTITIONER

## 2024-06-18 PROCEDURE — 85025 COMPLETE CBC W/AUTO DIFF WBC: CPT | Performed by: NURSE PRACTITIONER

## 2024-06-18 PROCEDURE — 80053 COMPREHEN METABOLIC PANEL: CPT | Performed by: NURSE PRACTITIONER

## 2024-06-18 PROCEDURE — 80061 LIPID PANEL: CPT | Performed by: NURSE PRACTITIONER

## 2024-06-18 PROCEDURE — 84443 ASSAY THYROID STIM HORMONE: CPT | Performed by: NURSE PRACTITIONER

## 2024-06-18 PROCEDURE — 84439 ASSAY OF FREE THYROXINE: CPT | Performed by: NURSE PRACTITIONER

## 2024-06-19 ENCOUNTER — PATIENT MESSAGE (OUTPATIENT)
Dept: FAMILY MEDICINE | Facility: CLINIC | Age: 44
End: 2024-06-19
Payer: COMMERCIAL

## 2024-07-06 ENCOUNTER — HOSPITAL ENCOUNTER (OUTPATIENT)
Dept: RADIOLOGY | Facility: HOSPITAL | Age: 44
Discharge: HOME | End: 2024-07-06
Attending: NURSE PRACTITIONER
Payer: COMMERCIAL

## 2024-07-06 VITALS — WEIGHT: 224 LBS | BODY MASS INDEX: 37.32 KG/M2 | HEIGHT: 65 IN

## 2024-07-06 DIAGNOSIS — Z12.31 ENCOUNTER FOR SCREENING MAMMOGRAM FOR BREAST CANCER: ICD-10-CM

## 2024-07-06 PROCEDURE — 77067 SCR MAMMO BI INCL CAD: CPT | Mod: TC,PO

## 2024-07-06 PROCEDURE — 77067 SCR MAMMO BI INCL CAD: CPT | Mod: 26,,, | Performed by: RADIOLOGY

## 2024-07-06 PROCEDURE — 77063 BREAST TOMOSYNTHESIS BI: CPT | Mod: 26,,, | Performed by: RADIOLOGY

## 2024-09-13 ENCOUNTER — PATIENT MESSAGE (OUTPATIENT)
Dept: FAMILY MEDICINE | Facility: CLINIC | Age: 44
End: 2024-09-13
Payer: COMMERCIAL

## 2024-09-13 DIAGNOSIS — B35.9 RINGWORM: Primary | ICD-10-CM

## 2024-09-14 ENCOUNTER — E-VISIT (OUTPATIENT)
Dept: FAMILY MEDICINE | Facility: CLINIC | Age: 44
End: 2024-09-14
Payer: COMMERCIAL

## 2024-09-14 DIAGNOSIS — B35.6 TINEA CRURIS: Primary | ICD-10-CM

## 2024-09-16 RX ORDER — FLUCONAZOLE 150 MG/1
150 TABLET ORAL WEEKLY
Qty: 6 TABLET | Refills: 0 | Status: SHIPPED | OUTPATIENT
Start: 2024-09-16 | End: 2024-10-22

## 2024-09-16 NOTE — PROGRESS NOTES
Patient ID: Jamee Armando is a 44 y.o. female.    Chief Complaint: Rash (Entered automatically based on patient selection in [employer].)    The patient initiated a request through [employer] on 9/14/2024 for evaluation and management with a chief complaint of Rash (Entered automatically based on patient selection in [employer].)     I evaluated the questionnaire submission on 9/16/24.    [facility] Peq Evisit Rash    9/16/2024  6:45 AM CDT - Filed by Patient   Do you agree to participate in an E-Visit? Yes   If you have any of the following symptoms, please present to your local emergency room or call 911:  I acknowledge   Choose the state of your primary residence [place]   Are you pregnant, could you be pregnant, or are you breast feeding? None of the above   What is the main issue you would like addressed today? Ringworm   How would you describe your skin problem? Rash   When did your symptoms first appear? 9/12/2024   Where is it located?  Face;  Arm(s)   Does it itch? Yes   Does it hurt? No   Is there discharge or drainage? No   Is there bleeding? No   Describe the character Spots;  Raised;  Scaly   Describe the color Red   Has it changed over time? Spread to other locations   Frequency of skin problem Fluctuates at random   Duration of the skin problem (how long does it stay when it is present) Never goes away   I have had a new exposure to Animals   I have had a new exposure to Animals   What have you used to treat the skin problem? Lotrimin   If you have used anything for treatment, has it helped the symptoms? Maybe   Other generalized symptoms that you associate with the rash No other symptoms   Provide any additional information you feel is important.    At least one photo is required for treatment to be provided. You can upload a maximum of three photos of the affected area.     Are you able to take your vital signs? Yes   Systolic Blood Pressure:    Diastolic Blood Pressure:    Weight: 200   Height: 55   Pulse: 76    Temperature:    Respiration rate:    Pulse Oxygen:          Encounter Diagnosis   Name Primary?    Tinea cruris Yes        No orders of the defined types were placed in this encounter.     Medications Ordered This Encounter   Medications    fluconazole (DIFLUCAN) 150 MG Tab     Sig: Take 1 tablet (150 mg total) by mouth once a week. for 6 doses     Dispense:  6 tablet     Refill:  0        Follow up if symptoms worsen or fail to improve.      E-Visit Time Trackin mins

## 2025-01-24 ENCOUNTER — OFFICE VISIT (OUTPATIENT)
Dept: FAMILY MEDICINE | Facility: CLINIC | Age: 45
End: 2025-01-24
Payer: COMMERCIAL

## 2025-01-24 VITALS
TEMPERATURE: 98 F | HEART RATE: 108 BPM | WEIGHT: 214.94 LBS | HEIGHT: 65 IN | RESPIRATION RATE: 98 BRPM | DIASTOLIC BLOOD PRESSURE: 74 MMHG | SYSTOLIC BLOOD PRESSURE: 118 MMHG | BODY MASS INDEX: 35.81 KG/M2

## 2025-01-24 DIAGNOSIS — J10.1 INFLUENZA A: Primary | ICD-10-CM

## 2025-01-24 LAB
CTP QC/QA: YES
CTP QC/QA: YES
POC MOLECULAR INFLUENZA A AGN: POSITIVE
POC MOLECULAR INFLUENZA B AGN: NEGATIVE
SARS-COV-2 RDRP RESP QL NAA+PROBE: NEGATIVE

## 2025-01-24 PROCEDURE — 99213 OFFICE O/P EST LOW 20 MIN: CPT | Mod: S$GLB,,, | Performed by: INTERNAL MEDICINE

## 2025-01-24 PROCEDURE — 87502 INFLUENZA DNA AMP PROBE: CPT | Mod: QW,,, | Performed by: INTERNAL MEDICINE

## 2025-01-24 PROCEDURE — 87635 SARS-COV-2 COVID-19 AMP PRB: CPT | Mod: QW,S$GLB,, | Performed by: INTERNAL MEDICINE

## 2025-01-24 RX ORDER — OSELTAMIVIR PHOSPHATE 75 MG/1
75 CAPSULE ORAL 2 TIMES DAILY
Qty: 10 CAPSULE | Refills: 0 | Status: SHIPPED | OUTPATIENT
Start: 2025-01-24 | End: 2025-01-29

## 2025-01-24 NOTE — PROGRESS NOTES
Subjective:       Patient ID: Jamee Armando is a 44 y.o. female.    Medication List with Changes/Refills   New Medications    OSELTAMIVIR (TAMIFLU) 75 MG CAPSULE    Take 1 capsule (75 mg total) by mouth 2 (two) times daily. for 5 days   Current Medications    DEXTROAMPHETAMINE-AMPHETAMINE 10 MG TAB    Take 1 tablet by mouth 3 (three) times daily.    HYDROXYZINE HCL (ATARAX) 25 MG TABLET    Take 25 mg by mouth.    ONDANSETRON (ZOFRAN-ODT) 8 MG TBDL    8 mg 2 (two) times daily as needed.    SEMAGLUTIDE, WEIGHT LOSS, SUBQ    Inject 50 Doses into the skin every 7 days.       Chief Complaint: Cough and Sore Throat  She presents with 4 days of coughing, congestion and runny nose. She has a mild sore throat and ear pain bilateral.  Temp to  with body aches and chills.  No wheezing or increase work of breathing. No diarrhea/vomiting. She has sinus pressure with congestion and headache. She is fatigued. No eye pain. No rashes. She is not sleeping well. Her child is sick with similar symptoms.     Review of Systems   Constitutional:  Positive for fatigue. Negative for activity change, appetite change, chills and fever.   HENT:  Positive for congestion, ear pain, sinus pressure, sore throat and trouble swallowing. Negative for drooling, ear discharge, mouth sores, postnasal drip and rhinorrhea.    Eyes:  Negative for pain, discharge and redness.   Respiratory:  Positive for cough and stridor. Negative for chest tightness, shortness of breath and wheezing.    Gastrointestinal:  Negative for abdominal pain, constipation, diarrhea, nausea and vomiting.   Genitourinary:  Negative for dysuria.   Musculoskeletal:  Positive for myalgias. Negative for arthralgias, neck pain and neck stiffness.   Skin:  Negative for rash.   Neurological:  Positive for headaches.   Hematological:  Negative for adenopathy.       Objective:      Vitals:    01/24/25 0858   BP: 118/74   BP Location: Left arm   Patient Position: Sitting   Pulse: 108  "  Resp: (!) 98   Temp: 97.7 °F (36.5 °C)   TempSrc: Temporal   Weight: 97.5 kg (214 lb 15.2 oz)   Height: 5' 5.25" (1.657 m)     Body mass index is 35.5 kg/m².  Physical Exam    General appearance: alert, no acute distress  Head: atraumatic  Eyes: PERRL, EMOI, normal conjunctiva, no drainage  Ears: tm normal with good visualization of landmarks, no erythema or pus, canals normal, external ear normal  Nose: erythematous mucosa, no polyps or sores, clear rhinorrhea  Throat: mild erythema, no exudates  Mouth: no sores or lesion, moist mucous membranes  Neck: supple, FROM, no masses, no tenderness  Lymph: no posterior or cervical adenopathy  Lungs: no distress, no retractions, clear to ascultation bilaterally, no wheezing, no rales, no rhonchi  Heart:: Regular rate and rhythm, no murmur  Abdomen: soft, non-tender, no guarding, no rebound, no peritoneal signs, bowel sounds normal, no hepatosplenomegaly, no masses  Skin: no rashes or lesion  Perfusion: good capillary refill, normal pulses    Assessment:       1. Influenza A        Plan:       Influenza A  Positive for influenza A. Supportive care.  Given tamiflu to take bid for 5 days. Advised of the symptoms of concern to return to the clinic.   -     oseltamivir (TAMIFLU) 75 MG capsule; Take 1 capsule (75 mg total) by mouth 2 (two) times daily. for 5 days  Dispense: 10 capsule; Refill: 0  -     POCT COVID-19 Rapid Screening---neg  -     POCT Influenza A/B Molecular---positive A     Follow up if symptoms worsen or fail to improve.        "

## (undated) DEVICE — DRESSING TELFA STRL 4X3 LF

## (undated) DEVICE — SEE MEDLINE ITEM 157181

## (undated) DEVICE — MYOSURE REACH

## (undated) DEVICE — SET IRR URLGY 2LINE UNIV SPIKE

## (undated) DEVICE — LEGGINGS 48X31 INCH

## (undated) DEVICE — CATH URETHRAL 16FR RED

## (undated) DEVICE — SEE MEDLINE ITEM 154981

## (undated) DEVICE — NEPTUNE 4 PORT MANIFOLD

## (undated) DEVICE — GOWN SMARTGOWN LVL4 X-LONG XL

## (undated) DEVICE — SEE MEDLINE ITEM 157131

## (undated) DEVICE — SEE MEDLINE ITEM 157128

## (undated) DEVICE — SEE L#120831

## (undated) DEVICE — SOL NS 1000CC

## (undated) DEVICE — GLOVE SURG BIOGEL LATEX SZ 7.5

## (undated) DEVICE — Device